# Patient Record
Sex: FEMALE | Race: WHITE | NOT HISPANIC OR LATINO | Employment: UNEMPLOYED | URBAN - METROPOLITAN AREA
[De-identification: names, ages, dates, MRNs, and addresses within clinical notes are randomized per-mention and may not be internally consistent; named-entity substitution may affect disease eponyms.]

---

## 2017-01-04 ENCOUNTER — LAB (OUTPATIENT)
Dept: LAB | Facility: HOSPITAL | Age: 32
End: 2017-01-04

## 2017-01-04 DIAGNOSIS — Z3A.09 9 WEEKS GESTATION OF PREGNANCY: Primary | ICD-10-CM

## 2017-01-04 LAB
ABO GROUP BLD: NORMAL
AMPHET+METHAMPHET UR QL: NEGATIVE
AMPHETAMINES UR QL: NEGATIVE
BACTERIA UR QL AUTO: ABNORMAL /HPF
BARBITURATES UR QL SCN: NEGATIVE
BASOPHILS # BLD AUTO: 0.01 10*3/MM3 (ref 0–0.2)
BASOPHILS NFR BLD AUTO: 0.1 % (ref 0–1)
BENZODIAZ UR QL SCN: NEGATIVE
BILIRUB UR QL STRIP: NEGATIVE
BLD GP AB SCN SERPL QL: NEGATIVE
BUPRENORPHINE SERPL-MCNC: NEGATIVE NG/ML
CANNABINOIDS SERPL QL: NEGATIVE
CLARITY UR: ABNORMAL
COCAINE UR QL: NEGATIVE
COLOR UR: YELLOW
DEPRECATED RDW RBC AUTO: 46.4 FL (ref 37–54)
EOSINOPHIL # BLD AUTO: 0.08 10*3/MM3 (ref 0.1–0.3)
EOSINOPHIL NFR BLD AUTO: 0.7 % (ref 0–3)
ERYTHROCYTE [DISTWIDTH] IN BLOOD BY AUTOMATED COUNT: 14.8 % (ref 11.3–14.5)
EXTERNAL ABO GROUPING: NORMAL
EXTERNAL ANTIBODY SCREEN: NEGATIVE
EXTERNAL HEPATITIS B SURFACE ANTIGEN: NEGATIVE
EXTERNAL HEPATITIS C AB: NEGATIVE
EXTERNAL RH FACTOR: POSITIVE
EXTERNAL RUBELLA QUALITATIVE: NORMAL
EXTERNAL SYPHILIS RPR SCREEN: NORMAL
GLUCOSE BLD-MCNC: 80 MG/DL (ref 70–100)
GLUCOSE UR STRIP-MCNC: NEGATIVE MG/DL
HBV SURFACE AG SERPL QL IA: NORMAL
HCT VFR BLD AUTO: 42.3 % (ref 34.5–44)
HCV AB SER DONR QL: NORMAL
HGB BLD-MCNC: 14.2 G/DL (ref 11.5–15.5)
HGB UR QL STRIP.AUTO: NEGATIVE
HIV1 AB SPEC QL IA.RAPID: NEGATIVE
HIV1+2 AB SER QL: NORMAL
HYALINE CASTS UR QL AUTO: ABNORMAL /LPF
IMM GRANULOCYTES # BLD: 0.02 10*3/MM3 (ref 0–0.03)
IMM GRANULOCYTES NFR BLD: 0.2 % (ref 0–0.6)
KETONES UR QL STRIP: NEGATIVE
LEUKOCYTE ESTERASE UR QL STRIP.AUTO: NEGATIVE
LYMPHOCYTES # BLD AUTO: 2 10*3/MM3 (ref 0.6–4.8)
LYMPHOCYTES NFR BLD AUTO: 18.7 % (ref 24–44)
MCH RBC QN AUTO: 29 PG (ref 27–31)
MCHC RBC AUTO-ENTMCNC: 33.6 G/DL (ref 32–36)
MCV RBC AUTO: 86.3 FL (ref 80–99)
METHADONE UR QL SCN: NEGATIVE
MONOCYTES # BLD AUTO: 0.63 10*3/MM3 (ref 0–1)
MONOCYTES NFR BLD AUTO: 5.9 % (ref 0–12)
NEUTROPHILS # BLD AUTO: 7.94 10*3/MM3 (ref 1.5–8.3)
NEUTROPHILS NFR BLD AUTO: 74.4 % (ref 41–71)
NITRITE UR QL STRIP: NEGATIVE
OPIATES UR QL: NEGATIVE
OXYCODONE UR QL SCN: NEGATIVE
PCP UR QL SCN: NEGATIVE
PH UR STRIP.AUTO: 5.5 [PH] (ref 5–8)
PLATELET # BLD AUTO: 410 10*3/MM3 (ref 150–450)
PMV BLD AUTO: 9.8 FL (ref 6–12)
PROPOXYPH UR QL: NEGATIVE
PROT UR QL STRIP: NEGATIVE
RBC # BLD AUTO: 4.9 10*6/MM3 (ref 3.89–5.14)
RBC # UR: ABNORMAL /HPF
REF LAB TEST METHOD: ABNORMAL
RH BLD: POSITIVE
RPR SER QL: NORMAL
RUBV IGG SER QL: NORMAL
RUBV IGG SER-ACNC: 82.9 IU/ML
SP GR UR STRIP: 1.02 (ref 1–1.03)
SQUAMOUS #/AREA URNS HPF: ABNORMAL /HPF
TRICYCLICS UR QL SCN: NEGATIVE
TSH SERPL DL<=0.05 MIU/L-ACNC: 1.81 MIU/ML (ref 0.35–5.35)
UROBILINOGEN UR QL STRIP: ABNORMAL
WBC NRBC COR # BLD: 10.68 10*3/MM3 (ref 3.5–10.8)
WBC UR QL AUTO: ABNORMAL /HPF

## 2017-01-04 PROCEDURE — 82947 ASSAY GLUCOSE BLOOD QUANT: CPT | Performed by: NURSE PRACTITIONER

## 2017-01-04 PROCEDURE — 87340 HEPATITIS B SURFACE AG IA: CPT | Performed by: NURSE PRACTITIONER

## 2017-01-04 PROCEDURE — 80306 DRUG TEST PRSMV INSTRMNT: CPT | Performed by: NURSE PRACTITIONER

## 2017-01-04 PROCEDURE — 86900 BLOOD TYPING SEROLOGIC ABO: CPT

## 2017-01-04 PROCEDURE — 86592 SYPHILIS TEST NON-TREP QUAL: CPT | Performed by: NURSE PRACTITIONER

## 2017-01-04 PROCEDURE — G0432 EIA HIV-1/HIV-2 SCREEN: HCPCS | Performed by: NURSE PRACTITIONER

## 2017-01-04 PROCEDURE — 86901 BLOOD TYPING SEROLOGIC RH(D): CPT

## 2017-01-04 PROCEDURE — 85025 COMPLETE CBC W/AUTO DIFF WBC: CPT | Performed by: NURSE PRACTITIONER

## 2017-01-04 PROCEDURE — 86850 RBC ANTIBODY SCREEN: CPT

## 2017-01-04 PROCEDURE — 86803 HEPATITIS C AB TEST: CPT | Performed by: NURSE PRACTITIONER

## 2017-01-04 PROCEDURE — 84443 ASSAY THYROID STIM HORMONE: CPT | Performed by: NURSE PRACTITIONER

## 2017-01-04 PROCEDURE — 36415 COLL VENOUS BLD VENIPUNCTURE: CPT

## 2017-01-04 PROCEDURE — 81001 URINALYSIS AUTO W/SCOPE: CPT | Performed by: NURSE PRACTITIONER

## 2017-06-01 ENCOUNTER — APPOINTMENT (OUTPATIENT)
Dept: LAB | Facility: HOSPITAL | Age: 32
End: 2017-06-01

## 2017-06-01 ENCOUNTER — TRANSCRIBE ORDERS (OUTPATIENT)
Dept: LAB | Facility: HOSPITAL | Age: 32
End: 2017-06-01

## 2017-06-01 DIAGNOSIS — Z3A.28 28 WEEKS GESTATION OF PREGNANCY: Primary | ICD-10-CM

## 2017-06-01 LAB
BLD GP AB SCN SERPL QL: NEGATIVE
DEPRECATED RDW RBC AUTO: 46.7 FL (ref 37–54)
ERYTHROCYTE [DISTWIDTH] IN BLOOD BY AUTOMATED COUNT: 14.3 % (ref 11.3–14.5)
GLUCOSE 1H P 100 G GLC PO SERPL-MCNC: 90 MG/DL (ref 65–199)
HCT VFR BLD AUTO: 37.2 % (ref 34.5–44)
HGB BLD-MCNC: 11.8 G/DL (ref 11.5–15.5)
MCH RBC QN AUTO: 28.6 PG (ref 27–31)
MCHC RBC AUTO-ENTMCNC: 31.7 G/DL (ref 32–36)
MCV RBC AUTO: 90.3 FL (ref 80–99)
PLATELET # BLD AUTO: 296 10*3/MM3 (ref 150–450)
PMV BLD AUTO: 8.8 FL (ref 6–12)
RBC # BLD AUTO: 4.12 10*6/MM3 (ref 3.89–5.14)
WBC NRBC COR # BLD: 9.55 10*3/MM3 (ref 3.5–10.8)

## 2017-06-01 PROCEDURE — 36415 COLL VENOUS BLD VENIPUNCTURE: CPT | Performed by: NURSE PRACTITIONER

## 2017-06-01 PROCEDURE — 82950 GLUCOSE TEST: CPT | Performed by: NURSE PRACTITIONER

## 2017-06-01 PROCEDURE — 86850 RBC ANTIBODY SCREEN: CPT | Performed by: NURSE PRACTITIONER

## 2017-06-01 PROCEDURE — 85027 COMPLETE CBC AUTOMATED: CPT | Performed by: NURSE PRACTITIONER

## 2017-07-18 LAB — EXTERNAL GROUP B STREP ANTIGEN: NEGATIVE

## 2017-08-19 ENCOUNTER — HOSPITAL ENCOUNTER (OUTPATIENT)
Facility: HOSPITAL | Age: 32
Setting detail: OBSERVATION
Discharge: HOME | End: 2017-08-19
Attending: NURSE PRACTITIONER | Admitting: OBSTETRICS & GYNECOLOGY

## 2017-08-19 VITALS — HEIGHT: 68 IN | TEMPERATURE: 98.3 F | WEIGHT: 256 LBS | BODY MASS INDEX: 38.8 KG/M2

## 2017-08-19 PROBLEM — Z34.90 PREGNANCY: Status: ACTIVE | Noted: 2017-08-19

## 2017-08-19 PROCEDURE — 59025 FETAL NON-STRESS TEST: CPT | Performed by: OBSTETRICS & GYNECOLOGY

## 2017-08-19 PROCEDURE — G0378 HOSPITAL OBSERVATION PER HR: HCPCS

## 2017-08-19 PROCEDURE — 59025 FETAL NON-STRESS TEST: CPT

## 2017-08-19 PROCEDURE — 99202 OFFICE O/P NEW SF 15 MIN: CPT | Performed by: OBSTETRICS & GYNECOLOGY

## 2017-08-19 RX ORDER — PRENATAL WITH FERROUS FUM AND FOLIC ACID 3080; 920; 120; 400; 22; 1.84; 3; 20; 10; 1; 12; 200; 27; 25; 2 [IU]/1; [IU]/1; MG/1; [IU]/1; MG/1; MG/1; MG/1; MG/1; MG/1; MG/1; UG/1; MG/1; MG/1; MG/1; MG/1
1 TABLET ORAL EVERY OTHER DAY
COMMUNITY

## 2017-08-19 NOTE — PROGRESS NOTES
[facility]  Obstetric History and Physical    Chief Complaint   Patient presents with   • Laboring     ctx, but not regular, some vag bleeding       Subjective     Patient is a 32 y.o. female  currently at 40w4d, who presents with irr contractions. Reports irregular contractions throughout the day without any leaking of fluid.  Patient does admit to having some small amount of bloody discharge.  Patient reports normal fetal activity.  Patient's prenatal course has been uncomplicated with patient Daljit.    Her prenatal care is complicated by  none.  Her previous obstetric/gynecological history is noted for is remarkable for .    The following portions of the patients history were reviewed and updated as appropriate: current medications, allergies, past medical history, past surgical history, past family history, past social history and problem list .       Prenatal Information:   Maternal Prenatal Labs  Blood Type No results found for: ABO   Rh Status No results found for: RH   Antibody Screen No results found for: ABSCRN   Gonnorhea No results found for: GCCX   Chlamydia No results found for: CLAMYDCU   RPR No results found for: RPR   Syphilis Antibody No results found for: SYPHILIS   Rubella No results found for: RUBELLAIGGIN   Hepatitis B Surface Antigen No results found for: HEPBSAG   HIV-1 Antibody No results found for: LABHIV1   Hepatitis C Antibody No results found for: HEPCAB   Rapid [place]rin Drug Screen No results found for: AMPMETHU, BARBITSCNUR, LABBENZSCN, LABMETHSCN, LABOPIASCN, THCURSCR, COCAINEUR, AMPHETSCREEN, PROPOXSCN, BUPRENORSCNU, METAMPSCNUR, OXYCODONESCN, TRICYCLICSCN   Group B Strep Culture No results found for: GBSANTIGEN                 Past OB History:       Obstetric History       T0      TAB0   SAB0   E0   M0   L0       # Outcome Date GA Lbr Trung/2nd Weight Sex Delivery Anes PTL Lv   1 Current                   Past Medical History: No past medical history on file.    Past Surgical History No past surgical history on file.   Family History: Family History   Problem Relation Age of Onset   • No Known Problems Father    • No Known Problems Mother       Social History:  reports that she has never smoked. She has never used smokeless tobacco.   reports that she does not drink alcohol.   reports that she does not use illicit drugs.                   General ROS Negative Findings:Headaches, Visual Changes, Epigastric pain, Anorexia, Nausia/Vomiting, ROM and Vaginal Bleeding    ROS      Objective       Vital Signs Range for the last 24 hours  Temperature: Temp:  [98.3 °F (36.8 °C)] 98.3 °F (36.8 °C)   Temp Source: Temp src: Oral   BP:     Pulse:     Respirations:     SPO2:     O2 Amount (l/min):     O2 Devices     Weight: Weight:  [256 lb (116 kg)] 256 lb (116 kg)     Physical Examination:   General:   alert, appears stated age and cooperative   Skin:   normal   HEENT:     Lungs:   clear to auscultation bilaterally   Heart:   regular rate and rhythm, S1, S2 normal, no murmur, click, rub or gallop   Abdomen:  soft, gravid uterus non tender   Lower Extremeties 1+ edema, no calf tenderness,    Pelvis:  External genitalia: normal general appearance         Presentation:    Cervix: Exam by: Method: sterile exam per physician   Dilation: Dilation: 2.5   Effacement: Cervical Effacement: 80%   Station: Station: -2       Fetal Heart Rate Assessment   Method:     Beats/min:     Baseline:     Varibility:     Accels:     Decels:     Tracing Category:     Nst ind  Ctx,  Reactive, mod variability, + accel 15x15, no decel, onset 1508 offset, 1815  Uterine Assessment   Method:     Frequency (min):     Ctx Count in 10 min:     Duration:     Intensity:     Intensity by IUPC:     Resting Tone:     Resting Tone by IUPC:     [place] Units:       Laboratory Results: @irehffcf11@  Radiology Review:@lastrad@  Other Studies:    Assessment/Plan     Active Problems:    Pregnancy        Assessment:  1.   Intrauterine pregnancy at 40w4d weeks gestation with reactive fetal status.    2.  No evidence of labor after 4hr  3.  Normal U/S 8/18 per patient  4.      Plan:  1. discharge to home, labor instructions,  2. Plan of care has been reviewed with patient.  3.  Risks, benefits of treatment plan have been discussed.  4.  All questions have been answered.  5      Elliot Cazares DO  8/19/2017  4:08 PM

## 2017-08-20 ENCOUNTER — HOSPITAL ENCOUNTER (INPATIENT)
Facility: HOSPITAL | Age: 32
LOS: 2 days | Discharge: HOME | End: 2017-08-22
Attending: NURSE PRACTITIONER | Admitting: OBSTETRICS & GYNECOLOGY

## 2017-08-20 DIAGNOSIS — Z37.9 NORMAL LABOR: Primary | ICD-10-CM

## 2017-08-20 LAB
ABO GROUP BLD: NORMAL
BLD GP AB SCN SERPL QL: NEGATIVE
DEPRECATED RDW RBC AUTO: 48 FL (ref 37–54)
ERYTHROCYTE [DISTWIDTH] IN BLOOD BY AUTOMATED COUNT: 15.9 % (ref 11.3–14.5)
HCT VFR BLD AUTO: 37.4 % (ref 34.5–44)
HGB BLD-MCNC: 12.1 G/DL (ref 11.5–15.5)
MCH RBC QN AUTO: 26.9 PG (ref 27–31)
MCHC RBC AUTO-ENTMCNC: 32.4 G/DL (ref 32–36)
MCV RBC AUTO: 83.3 FL (ref 80–99)
PLATELET # BLD AUTO: 257 10*3/MM3 (ref 150–450)
PMV BLD AUTO: 9.7 FL (ref 6–12)
RBC # BLD AUTO: 4.49 10*6/MM3 (ref 3.89–5.14)
RH BLD: POSITIVE
WBC NRBC COR # BLD: 11.1 10*3/MM3 (ref 3.5–10.8)

## 2017-08-20 PROCEDURE — 86900 BLOOD TYPING SEROLOGIC ABO: CPT | Performed by: OBSTETRICS & GYNECOLOGY

## 2017-08-20 PROCEDURE — 59025 FETAL NON-STRESS TEST: CPT

## 2017-08-20 PROCEDURE — P9612 CATHETERIZE FOR URINE SPEC: HCPCS

## 2017-08-20 PROCEDURE — 85027 COMPLETE CBC AUTOMATED: CPT | Performed by: OBSTETRICS & GYNECOLOGY

## 2017-08-20 PROCEDURE — 86850 RBC ANTIBODY SCREEN: CPT | Performed by: OBSTETRICS & GYNECOLOGY

## 2017-08-20 PROCEDURE — 86901 BLOOD TYPING SEROLOGIC RH(D): CPT | Performed by: OBSTETRICS & GYNECOLOGY

## 2017-08-20 RX ORDER — MISOPROSTOL 200 UG/1
800 TABLET ORAL AS NEEDED
Status: DISCONTINUED | OUTPATIENT
Start: 2017-08-20 | End: 2017-08-20 | Stop reason: HOSPADM

## 2017-08-20 RX ORDER — LIDOCAINE HYDROCHLORIDE 10 MG/ML
5 INJECTION, SOLUTION INFILTRATION; PERINEURAL AS NEEDED
Status: DISCONTINUED | OUTPATIENT
Start: 2017-08-20 | End: 2017-08-20 | Stop reason: HOSPADM

## 2017-08-20 RX ORDER — OXYTOCIN/RINGER'S LACTATE 20/1000 ML
125 PLASTIC BAG, INJECTION (ML) INTRAVENOUS CONTINUOUS PRN
Status: DISCONTINUED | OUTPATIENT
Start: 2017-08-20 | End: 2017-08-20 | Stop reason: HOSPADM

## 2017-08-20 RX ORDER — ONDANSETRON 2 MG/ML
4 INJECTION INTRAMUSCULAR; INTRAVENOUS EVERY 6 HOURS PRN
Status: DISCONTINUED | OUTPATIENT
Start: 2017-08-20 | End: 2017-08-22 | Stop reason: HOSPADM

## 2017-08-20 RX ORDER — MINERAL OIL
OIL (ML) MISCELLANEOUS
Status: DISCONTINUED | OUTPATIENT
Start: 2017-08-20 | End: 2017-08-22 | Stop reason: HOSPADM

## 2017-08-20 RX ORDER — OXYTOCIN/RINGER'S LACTATE 20/1000 ML
999 PLASTIC BAG, INJECTION (ML) INTRAVENOUS ONCE
Status: COMPLETED | OUTPATIENT
Start: 2017-08-20 | End: 2017-08-20

## 2017-08-20 RX ORDER — ONDANSETRON 4 MG/1
4 TABLET, FILM COATED ORAL EVERY 6 HOURS PRN
Status: DISCONTINUED | OUTPATIENT
Start: 2017-08-20 | End: 2017-08-20 | Stop reason: HOSPADM

## 2017-08-20 RX ORDER — ONDANSETRON 4 MG/1
4 TABLET, FILM COATED ORAL EVERY 6 HOURS PRN
Status: DISCONTINUED | OUTPATIENT
Start: 2017-08-20 | End: 2017-08-22 | Stop reason: HOSPADM

## 2017-08-20 RX ORDER — SODIUM CHLORIDE 0.9 % (FLUSH) 0.9 %
1-10 SYRINGE (ML) INJECTION AS NEEDED
Status: DISCONTINUED | OUTPATIENT
Start: 2017-08-20 | End: 2017-08-20 | Stop reason: HOSPADM

## 2017-08-20 RX ORDER — DOCUSATE SODIUM 100 MG/1
100 CAPSULE, LIQUID FILLED ORAL 2 TIMES DAILY PRN
Status: DISCONTINUED | OUTPATIENT
Start: 2017-08-20 | End: 2017-08-22 | Stop reason: HOSPADM

## 2017-08-20 RX ORDER — SODIUM CHLORIDE, SODIUM LACTATE, POTASSIUM CHLORIDE, CALCIUM CHLORIDE 600; 310; 30; 20 MG/100ML; MG/100ML; MG/100ML; MG/100ML
125 INJECTION, SOLUTION INTRAVENOUS CONTINUOUS
Status: DISCONTINUED | OUTPATIENT
Start: 2017-08-20 | End: 2017-08-22 | Stop reason: HOSPADM

## 2017-08-20 RX ORDER — PRENATAL VIT/IRON FUM/FOLIC AC 27MG-0.8MG
1 TABLET ORAL DAILY
Status: DISCONTINUED | OUTPATIENT
Start: 2017-08-21 | End: 2017-08-22 | Stop reason: HOSPADM

## 2017-08-20 RX ORDER — CARBOPROST TROMETHAMINE 250 UG/ML
250 INJECTION, SOLUTION INTRAMUSCULAR AS NEEDED
Status: DISCONTINUED | OUTPATIENT
Start: 2017-08-20 | End: 2017-08-20 | Stop reason: HOSPADM

## 2017-08-20 RX ORDER — IBUPROFEN 600 MG/1
600 TABLET ORAL EVERY 6 HOURS PRN
Status: DISCONTINUED | OUTPATIENT
Start: 2017-08-20 | End: 2017-08-22 | Stop reason: HOSPADM

## 2017-08-20 RX ORDER — BISACODYL 10 MG
10 SUPPOSITORY, RECTAL RECTAL DAILY PRN
Status: DISCONTINUED | OUTPATIENT
Start: 2017-08-21 | End: 2017-08-22 | Stop reason: HOSPADM

## 2017-08-20 RX ORDER — CALCIUM CARBONATE 200(500)MG
2 TABLET,CHEWABLE ORAL 3 TIMES DAILY PRN
Status: DISCONTINUED | OUTPATIENT
Start: 2017-08-20 | End: 2017-08-22 | Stop reason: HOSPADM

## 2017-08-20 RX ORDER — ONDANSETRON 2 MG/ML
4 INJECTION INTRAMUSCULAR; INTRAVENOUS EVERY 6 HOURS PRN
Status: DISCONTINUED | OUTPATIENT
Start: 2017-08-20 | End: 2017-08-20 | Stop reason: HOSPADM

## 2017-08-20 RX ORDER — ACETAMINOPHEN 325 MG/1
650 TABLET ORAL EVERY 4 HOURS PRN
Status: DISCONTINUED | OUTPATIENT
Start: 2017-08-20 | End: 2017-08-22 | Stop reason: HOSPADM

## 2017-08-20 RX ORDER — METHYLERGONOVINE MALEATE 0.2 MG/ML
200 INJECTION INTRAVENOUS ONCE AS NEEDED
Status: DISCONTINUED | OUTPATIENT
Start: 2017-08-20 | End: 2017-08-20 | Stop reason: HOSPADM

## 2017-08-20 RX ORDER — ACETAMINOPHEN 325 MG/1
650 TABLET ORAL EVERY 4 HOURS PRN
Status: DISCONTINUED | OUTPATIENT
Start: 2017-08-20 | End: 2017-08-20 | Stop reason: HOSPADM

## 2017-08-20 RX ORDER — SODIUM CHLORIDE 0.9 % (FLUSH) 0.9 %
1-10 SYRINGE (ML) INJECTION AS NEEDED
Status: DISCONTINUED | OUTPATIENT
Start: 2017-08-20 | End: 2017-08-22 | Stop reason: HOSPADM

## 2017-08-20 RX ADMIN — HYDROCORTISONE 2.5% 1 APPLICATION: 25 CREAM TOPICAL at 22:08

## 2017-08-20 RX ADMIN — IBUPROFEN 600 MG: 600 TABLET, FILM COATED ORAL at 22:08

## 2017-08-20 RX ADMIN — Medication 999 ML/HR: at 19:35

## 2017-08-20 RX ADMIN — Medication 125 ML/HR: at 20:45

## 2017-08-20 RX ADMIN — Medication 2 APPLICATION: at 18:48

## 2017-08-20 RX ADMIN — BENZOCAINE 1 APPLICATION: 5.6 OINTMENT TOPICAL at 22:09

## 2017-08-20 RX ADMIN — BENZOCAINE AND MENTHOL: 20; .5 SPRAY TOPICAL at 22:08

## 2017-08-20 RX ADMIN — WITCH HAZEL 1 PAD: 500 SOLUTION RECTAL; TOPICAL at 22:08

## 2017-08-20 NOTE — PLAN OF CARE
Problem: Patient Care Overview (Adult)  Goal: Plan of Care Review  Outcome: Ongoing (interventions implemented as appropriate)    08/20/17 0550   Coping/Psychosocial Response Interventions   Plan Of Care Reviewed With patient   Patient Care Overview   Progress progress toward functional goals as expected       Goal: Adult Individualization and Mutuality  Outcome: Ongoing (interventions implemented as appropriate)  Goal: Discharge Needs Assessment  Outcome: Ongoing (interventions implemented as appropriate)    Problem: Labor (Cervical Ripen, Induct, Augment) (Adult,Obstetrics,Pediatric)  Goal: Signs and Symptoms of Listed Potential Problems Will be Absent or Manageable (Labor)  Outcome: Ongoing (interventions implemented as appropriate)

## 2017-08-20 NOTE — NURSING NOTE
Dr. Davis called to inquiry about patient reviewed most recent exam and plan of care no new orders received.

## 2017-08-20 NOTE — PROGRESS NOTES
"08/20/17  5:30 PM  Esperanza Waldron    SUBJECTIVE: Pt continues to do well, laboring naturally.     ASSESSMENTS:     /84  Pulse (!) 125  Temp 98.1 °F (36.7 °C) (Oral)   Resp 18  Ht 67.5\" (171.5 cm)  Wt 256 lb (116 kg)  Breastfeeding? Yes  BMI 39.5 kg/m2    Fetal Heart Rate Assessment   Method: Fetal HR Assessment Method: intermittent auscultation, using Doppler   Beats/min: Fetal HR (Beats/Min): 142   Baseline: Fetal HR Baseline: normal range (110-160 bpm)   Varibility: Fetal HR Variability: moderate (amplitude range 6 to 25 bpm)   Accels: Fetal HR Accelerations: greater than/equal to 15 bpm, lasting at least 15 seconds   Decels: Fetal HR Decelerations: absent   Tracing Category:       Uterine Assessment   Method: Method: TOCO (external toco transducer)   Frequency (min): Contraction Frequency (min): 3-4   Ctx Count in 10 min:     Duration: Contraction Duration (sec): 40-60   Intensity: Contraction Intensity: mild by palpation   Intensity by IUPC:     Resting Tone: Uterine Resting Tone: soft by palpation   Resting Tone by IUPC:       Presentation: vtx   Cervix: Exam by: Method: sterile exam per RN   Dilation: Dilation: 9.5   Effacement: Cervical Effacement: 100%   Station: Station: 0            PLAN: Anterior lip with small lip on maternal R side. Pt moved to R lateral position. Will continue expectant management.    Inge Davis MD  5:30 PM  08/20/17    "

## 2017-08-20 NOTE — PROGRESS NOTES
"08/20/17  2:35 PM  Esperanza Waldron    SUBJECTIVE: Pt doing well with natural labor. Has been laboring in tub. Progressing without augmentation, breathing through ctx. +FM, clear fluid with ROM, no VB.    ASSESSMENTS:     /73 (BP Location: Right arm, Patient Position: Sitting)  Pulse 120  Temp 98.5 °F (36.9 °C) (Oral)   Resp 18  Ht 67.5\" (171.5 cm)  Wt 256 lb (116 kg)  Breastfeeding? Yes  BMI 39.5 kg/m2    Fetal Heart Rate Assessment   Method: Fetal HR Assessment Method: intermittent auscultation, using Doppler   Beats/min: Fetal HR (Beats/Min): 142   Baseline: Fetal HR Baseline: normal range (110-160 bpm)   Varibility: Fetal HR Variability: moderate (amplitude range 6 to 25 bpm)   Accels: Fetal HR Accelerations: greater than/equal to 15 bpm, lasting at least 15 seconds   Decels: Fetal HR Decelerations: absent   Tracing Category:       Uterine Assessment   Method: Method: TOCO (external toco transducer)   Frequency (min): Contraction Frequency (min): 3-4   Ctx Count in 10 min:     Duration: Contraction Duration (sec): 40-60   Intensity: Contraction Intensity: mild by palpation   Intensity by IUPC:     Resting Tone: Uterine Resting Tone: soft by palpation   Resting Tone by IUPC:       Presentation: vtx   Cervix: Exam by: Method: sterile exam per physician   Dilation: Dilation: 7   Effacement: Cervical Effacement: 80%   Station: Station: 0            PLAN: Continue expectant management.    Inge Davis MD  2:35 PM  08/20/17    "

## 2017-08-21 LAB
HCT VFR BLD AUTO: 31.5 % (ref 34.5–44)
HGB BLD-MCNC: 10.2 G/DL (ref 11.5–15.5)

## 2017-08-21 PROCEDURE — 85014 HEMATOCRIT: CPT | Performed by: OBSTETRICS & GYNECOLOGY

## 2017-08-21 PROCEDURE — 85018 HEMOGLOBIN: CPT | Performed by: OBSTETRICS & GYNECOLOGY

## 2017-08-21 RX ORDER — LANOLIN 100 %
OINTMENT (GRAM) TOPICAL AS NEEDED
Status: DISCONTINUED | OUTPATIENT
Start: 2017-08-21 | End: 2017-08-22 | Stop reason: HOSPADM

## 2017-08-21 RX ADMIN — PRENATAL VIT W/ FE FUMARATE-FA TAB 27-0.8 MG 1 TABLET: 27-0.8 TAB at 08:41

## 2017-08-21 RX ADMIN — IBUPROFEN 600 MG: 600 TABLET, FILM COATED ORAL at 12:48

## 2017-08-21 RX ADMIN — DOCUSATE SODIUM 100 MG: 100 CAPSULE, LIQUID FILLED ORAL at 08:41

## 2017-08-21 RX ADMIN — Medication: at 08:30

## 2017-08-21 NOTE — L&D DELIVERY NOTE
Uncomplicated  over a second degree laceration.  Anterior shoulder delivered with ease.  Infant vigorous at delivery so placed on maternal abdomen.  Delayed cord clamping x 1 min.  Cord doubly clamped and cut.  Laceration repaired in standard fashion using 3-0 vicryl.

## 2017-08-21 NOTE — PLAN OF CARE
Problem: Patient Care Overview (Adult)  Goal: Plan of Care Review  Outcome: Ongoing (interventions implemented as appropriate)    Problem: Breastfeeding (Adult,NICU,[place],Obstetrics,Pediatric)  Goal: Signs and Symptoms of Listed Potential Problems Will be Absent or Manageable (Breastfeeding)  Outcome: Ongoing (interventions implemented as appropriate)

## 2017-08-21 NOTE — PROGRESS NOTES
[facility]  Vaginal Delivery Progress Note    Subjective     Doing well, pain controlled, lochia less than menses      Objective     Vital Signs Range for the last 24 hours  Temperature: Temp:  [97.6 °F (36.4 °C)-98.8 °F (37.1 °C)] 97.7 °F (36.5 °C)   Temp Source: Temp src: Oral   BP: BP: ()/(56-83) 113/68   Pulse: Heart Rate:  [] 89   Respirations: Resp:  [16-18] 16   SPO2:     O2 Amount (l/min):     O2 Devices           Physical Exam:  General:  no acute distresss.  Abdomen: Soft, non-tender, fundus firm  Extremities: normal, atraumatic, no cyanosis, and trace edema.       Lab results reviewed:  Yes    Lab Results   Component Value Date    WBC 11.10 (H) 08/20/2017    HGB 10.2 (L) 08/21/2017    HCT 31.5 (L) 08/21/2017    MCV 83.3 08/20/2017     08/20/2017         Assessment/Plan     Active Problems:    Postpartum care following vaginal delivery      Esperanza Waldron is Day 1  post-partum       Plan:  Continue current care.      Talia Bocanegra CNM  8/21/2017  5:12 PM

## 2017-08-21 NOTE — LACTATION NOTE
"This note was copied from a baby's chart.     08/21/17 3974   Maternal Information   Date of Referral 08/21/17   Person Making Referral other (see comments)  (courtesy)   Maternal Infant Assessment   Size Issue, Bilateral Breasts no   Shape, Bilateral Breasts round   Density, Bilateral Breasts soft   Nipple Conditions, Bilateral intact   Maternal Infant Feeding   Maternal Emotional State relaxed   Previous Breastfeeding History no   Infant Positioning cradle;clutch/\"football\"  (per mom)   Current Delivery Breastfeeding History   Currently Breastfeeding no   Equipment Type/Education   Breast Pump Type (Mom has signed Rx, gave card for [facility] and enc. call ASAP)     "

## 2017-08-22 ENCOUNTER — HOSPITAL ENCOUNTER (OUTPATIENT)
Dept: LABOR AND DELIVERY | Facility: HOSPITAL | Age: 32
End: 2017-08-22

## 2017-08-22 VITALS
HEIGHT: 68 IN | HEART RATE: 77 BPM | BODY MASS INDEX: 38.8 KG/M2 | DIASTOLIC BLOOD PRESSURE: 58 MMHG | RESPIRATION RATE: 18 BRPM | WEIGHT: 256 LBS | SYSTOLIC BLOOD PRESSURE: 101 MMHG | TEMPERATURE: 98.7 F

## 2017-08-22 PROBLEM — Z34.90 PREGNANCY: Status: RESOLVED | Noted: 2017-08-19 | Resolved: 2017-08-22

## 2017-08-22 RX ADMIN — IBUPROFEN 600 MG: 600 TABLET, FILM COATED ORAL at 08:30

## 2017-08-22 RX ADMIN — PRENATAL VIT W/ FE FUMARATE-FA TAB 27-0.8 MG 1 TABLET: 27-0.8 TAB at 08:30

## 2017-08-22 RX ADMIN — DOCUSATE SODIUM 100 MG: 100 CAPSULE, LIQUID FILLED ORAL at 08:30

## 2017-08-22 NOTE — DISCHARGE SUMMARY
[facility]  Vaginal Delivery Discharge Summary      Patient: Esperanza Waldron      MR#:[id number]  Admission  Diagnosis: Term Pregnancy  Discharge Diagnosis: Vaginal Delivery    Date of Admission: 8/20/2017  Date of Discharge:  8/22/2017    Procedures:  Vaginal, Spontaneous Delivery     8/20/2017    7:26 PM      Service:  Obstetrics    Hospital Course:  Patient underwent vaginal delivery and remained in the hospital for 2 days.  During that time she remained afebrile and hemodynamically stable.  On the day of discharge, she was eating, ambulating and voiding without difficulty.      Lab Results   Component Value Date    WBC 11.10 (H) 08/20/2017    HGB 10.2 (L) 08/21/2017    HCT 31.5 (L) 08/21/2017    MCV 83.3 08/20/2017     08/20/2017       Results from last 7 days  Lab Units 08/20/17  0344   ABO TYPING  O   RH TYPING  Positive   ANTIBODY SCREEN  Negative       Discharge Medications   Esperanza Waldron   Home Medication Instructions RADHAMES:[id number]    Printed on:08/22/17 0854   Medication Information                      Prenatal Vit-Fe Fumarate-FA (PRENATAL 27-1) 27-1 MG tablet tablet  Take 1 tablet by mouth Every Other Day.                 Discharge Disposition:  To Home    Discharge Condition:  Stable    Discharge Diet: regular    Activity at Discharge: Pelvic rest    Follow-up Appointments  6 weeks    Talia Bocanegra CNM  08/22/17  8:54 AM

## 2017-08-22 NOTE — PLAN OF CARE
Problem: Patient Care Overview (Adult)  Goal: Plan of Care Review  Outcome: Outcome(s) achieved Date Met:  17 1050   Coping/Psychosocial Response Interventions   Plan Of Care Reviewed With patient   Patient Care Overview   Progress improving       Goal: Adult Individualization and Mutuality  Outcome: Outcome(s) achieved Date Met:  17  Goal: Discharge Needs Assessment  Outcome: Outcome(s) achieved Date Met:  17    Problem: Labor (Cervical Ripen, Induct, Augment) (Adult,Obstetrics,Pediatric)  Goal: Signs and Symptoms of Listed Potential Problems Will be Absent or Manageable (Labor)  Outcome: Outcome(s) achieved Date Met:  17    Problem: Breastfeeding (Adult,NICU,,Obstetrics,Pediatric)  Goal: Signs and Symptoms of Listed Potential Problems Will be Absent or Manageable (Breastfeeding)  Outcome: Outcome(s) achieved Date Met:  17

## 2017-08-22 NOTE — PROGRESS NOTES
[facility]  Vaginal Delivery Progress Note    Subjective     Doing well, pain controlled, lochia less than menses      Objective     Vital Signs Range for the last 24 hours  Temperature: Temp:  [97.7 °F (36.5 °C)-98.7 °F (37.1 °C)] 98.7 °F (37.1 °C)   Temp Source: Temp src: Oral   BP: BP: (101-113)/(58-68) 101/58   Pulse: Heart Rate:  [77-89] 77   Respirations: Resp:  [16-18] 18   SPO2:     O2 Amount (l/min):     O2 Devices           Physical Exam:  General:  no acute distresss.  Abdomen: Soft, non-tender, fundus firm  Extremities: normal, atraumatic, no cyanosis, and trace edema.       Lab results reviewed:  Yes    Lab Results   Component Value Date    WBC 11.10 (H) 08/20/2017    HGB 10.2 (L) 08/21/2017    HCT 31.5 (L) 08/21/2017    MCV 83.3 08/20/2017     08/20/2017         Assessment/Plan     Active Problems:    Postpartum care following vaginal delivery      Esperanza Waldron is Day 2  post-partum       Plan:  Discharge home with standard precautions and return to clinic in 4-6 weeks.      Talia Bocanegra CNM  8/22/2017  8:52 AM

## 2017-08-22 NOTE — PLAN OF CARE
Problem: Patient Care Overview (Adult)  Goal: Plan of Care Review  Outcome: Ongoing (interventions implemented as appropriate)    17 0631   Coping/Psychosocial Response Interventions   Plan Of Care Reviewed With patient;spouse   Patient Care Overview   Progress improving   Outcome Evaluation   Outcome Summary/Follow up Plan VSS. Pt up ad bentley. No pain meds taken this shift. Breastfeeding Q 1-3 hours.       Goal: Adult Individualization and Mutuality  Outcome: Ongoing (interventions implemented as appropriate)  Goal: Discharge Needs Assessment  Outcome: Ongoing (interventions implemented as appropriate)    Problem: Breastfeeding (Adult,NICU,[place],Obstetrics,Pediatric)  Goal: Signs and Symptoms of Listed Potential Problems Will be Absent or Manageable (Breastfeeding)  Outcome: Ongoing (interventions implemented as appropriate)

## 2018-12-10 ENCOUNTER — LAB (OUTPATIENT)
Dept: LAB | Facility: HOSPITAL | Age: 33
End: 2018-12-10

## 2018-12-10 ENCOUNTER — TRANSCRIBE ORDERS (OUTPATIENT)
Dept: LAB | Facility: HOSPITAL | Age: 33
End: 2018-12-10

## 2018-12-10 DIAGNOSIS — Z32.01 PREGNANCY EXAMINATION OR TEST, POSITIVE RESULT: Primary | ICD-10-CM

## 2018-12-10 DIAGNOSIS — Z32.01 PREGNANCY EXAMINATION OR TEST, POSITIVE RESULT: ICD-10-CM

## 2018-12-10 LAB
ABO GROUP BLD: NORMAL
B-HCG UR QL: POSITIVE
BLD GP AB SCN SERPL QL: NEGATIVE
PROGEST SERPL-MCNC: 2.85 NG/ML
RH BLD: POSITIVE

## 2018-12-10 PROCEDURE — 81025 URINE PREGNANCY TEST: CPT

## 2018-12-10 PROCEDURE — 84702 CHORIONIC GONADOTROPIN TEST: CPT

## 2018-12-10 PROCEDURE — 86901 BLOOD TYPING SEROLOGIC RH(D): CPT

## 2018-12-10 PROCEDURE — 86900 BLOOD TYPING SEROLOGIC ABO: CPT

## 2018-12-10 PROCEDURE — 86850 RBC ANTIBODY SCREEN: CPT

## 2018-12-10 PROCEDURE — 84144 ASSAY OF PROGESTERONE: CPT

## 2018-12-10 PROCEDURE — 36415 COLL VENOUS BLD VENIPUNCTURE: CPT

## 2018-12-12 LAB — HCG INTACT+B SERPL-ACNC: 8031 MIU/ML

## 2021-05-18 DIAGNOSIS — O20.0 THREATENED ABORTION: Primary | ICD-10-CM

## 2021-05-18 NOTE — TELEPHONE ENCOUNTER
Pt calling to maker her NOB, LMP 3/11, now bleeding like a period. Blood type Rh +. Super irreg periods.  Her mom is Jacquie Millsjaime

## 2021-05-19 ENCOUNTER — LAB (OUTPATIENT)
Dept: OBSTETRICS AND GYNECOLOGY | Facility: CLINIC | Age: 36
End: 2021-05-19

## 2021-05-19 DIAGNOSIS — O20.0 THREATENED ABORTION: Primary | ICD-10-CM

## 2021-05-20 ENCOUNTER — LAB (OUTPATIENT)
Dept: OBSTETRICS AND GYNECOLOGY | Facility: CLINIC | Age: 36
End: 2021-05-20

## 2021-05-20 ENCOUNTER — TELEPHONE (OUTPATIENT)
Dept: OBSTETRICS AND GYNECOLOGY | Facility: CLINIC | Age: 36
End: 2021-05-20

## 2021-05-20 LAB
HCG INTACT+B SERPL-ACNC: NORMAL MIU/ML
PROGEST SERPL-MCNC: 0.3 NG/ML

## 2021-05-20 RX ORDER — PROGESTERONE 100 MG/1
100 CAPSULE ORAL DAILY
Qty: 30 CAPSULE | Refills: 1 | Status: SHIPPED | OUTPATIENT
Start: 2021-05-20

## 2021-05-20 NOTE — ADDENDUM NOTE
[address] [place] [address]  [address] [place] [address]. [place] [address]  Phone: [phone]  Fax: [phone]    Mary Cohen LPN        March 22, 2021    [place] [address]      Dear Viviane Saavedra:    We have made several attempts to contact you by phone and have   been unsuccessful. Please call our office at your earliest convenience  At [phone] opt [phone]. Thank you. If you have any questions or concerns, please don't hesitate to call.     Sincerely,        Mary Cohen LPN Addended by: JOBY BRANHAM on: 5/20/2021 02:19 PM     Modules accepted: Orders

## 2021-05-20 NOTE — TELEPHONE ENCOUNTER
Her prog is very low. She states she is over weight and has been taking that Inositol for the past few months. We have never seen this pt - Does she need apt or just prog and repeat labs. She states she has been bleeding for a while.

## 2021-05-20 NOTE — TELEPHONE ENCOUNTER
Come in for stat hcg tomorrow, Prom 100mg po until 10-12 weeks pregnancy and US and see Dr. Dalton Monday (GYN/TAB)

## 2021-05-20 NOTE — TELEPHONE ENCOUNTER
Patient has questions about lab results that she saw on mychart. She also states that she passed a large clot but that she threw up last night and has nausea as well today. She says she does not know what is going on.

## 2021-05-21 ENCOUNTER — LAB (OUTPATIENT)
Dept: OBSTETRICS AND GYNECOLOGY | Facility: CLINIC | Age: 36
End: 2021-05-21

## 2021-05-21 DIAGNOSIS — O03.9 SPONTANEOUS ABORTION: Primary | ICD-10-CM

## 2021-05-24 ENCOUNTER — TELEPHONE (OUTPATIENT)
Dept: OBSTETRICS AND GYNECOLOGY | Facility: CLINIC | Age: 36
End: 2021-05-24

## 2021-05-24 DIAGNOSIS — O03.9 MISCARRIAGE: Primary | ICD-10-CM

## 2021-05-24 NOTE — TELEPHONE ENCOUNTER
Pt is having right sided pain that comes and goes, rates a 4 on scale of 0-10 - she has passed tissue but no sac. Denies saturating a pad every 30 min to 1 hour, fever, severe pain. Dr. Dalton said to cancel the NOB and repeat her hcg tomorrow. ovu

## 2021-05-26 ENCOUNTER — TELEPHONE (OUTPATIENT)
Dept: OBSTETRICS AND GYNECOLOGY | Facility: CLINIC | Age: 36
End: 2021-05-26

## 2021-06-01 ENCOUNTER — LAB (OUTPATIENT)
Dept: OBSTETRICS AND GYNECOLOGY | Facility: CLINIC | Age: 36
End: 2021-06-01

## 2021-06-02 LAB — HCG INTACT+B SERPL-ACNC: 207 MIU/ML

## 2022-05-20 DIAGNOSIS — Z34.90 PREGNANCY, UNSPECIFIED GESTATIONAL AGE: Primary | ICD-10-CM

## 2022-06-01 ENCOUNTER — INITIAL PRENATAL (OUTPATIENT)
Dept: OBSTETRICS AND GYNECOLOGY | Facility: CLINIC | Age: 37
End: 2022-06-01

## 2022-06-01 VITALS — WEIGHT: 264 LBS | SYSTOLIC BLOOD PRESSURE: 120 MMHG | BODY MASS INDEX: 40.74 KG/M2 | DIASTOLIC BLOOD PRESSURE: 70 MMHG

## 2022-06-01 DIAGNOSIS — Z34.90 PREGNANCY, UNSPECIFIED GESTATIONAL AGE: ICD-10-CM

## 2022-06-01 DIAGNOSIS — O26.21 HISTORY OF RECURRENT ABORTION, CURRENTLY PREGNANT IN FIRST TRIMESTER: Primary | ICD-10-CM

## 2022-06-01 LAB
GLUCOSE UR STRIP-MCNC: NEGATIVE MG/DL
PROT UR STRIP-MCNC: NEGATIVE MG/DL

## 2022-06-01 PROCEDURE — 0501F PRENATAL FLOW SHEET: CPT | Performed by: OBSTETRICS & GYNECOLOGY

## 2022-06-01 PROCEDURE — 76817 TRANSVAGINAL US OBSTETRIC: CPT | Performed by: OBSTETRICS & GYNECOLOGY

## 2022-06-01 NOTE — PROGRESS NOTES
Initial ob visit     CC- Here for care of pregnancy Pt having some nausea, but managable. Hx of 2 MAB. Rev prenatal book. Due for pap     Esperanza Waldron is a 36 y.o. female, , who presents for her first obstetrical visit.  Her last LMP was Patient's last menstrual period was 2022..    # 1 - Date: None, Sex: None, Weight: None, GA: None, Delivery: None, Apgar1: None, Apgar5: None, Living: None, Birth Comments: None    # 2 - Date: None, Sex: None, Weight: None, GA: None, Delivery: None, Apgar1: None, Apgar5: None, Living: None, Birth Comments: None    # 3 - Date: None, Sex: None, Weight: None, GA: None, Delivery: None, Apgar1: None, Apgar5: None, Living: None, Birth Comments: None    # 4 - Date: 17, Sex: Female, Weight: 3960 g (8 lb 11.7 oz), GA: 40w5d, Delivery: Vaginal, Spontaneous, Apgar1: 9, Apgar5: 9, Living: Living, Birth Comments: None    # 5 - Date: None, Sex: None, Weight: None, GA: None, Delivery: None, Apgar1: None, Apgar5: None, Living: None, Birth Comments: None      Current obstetric complaints : nausea   Initial positive test date : 2022     Location : home    Prior obstetric issues, AMA and Vaginal bleeding in first trimester  Potential pregnancy concerns: 2 MAB  Family history of genetic issues (includes FOB):   Prior infections concerning in pregnancy (Rash, fever in last 2 weeks): none  Varicella Hx -as child  Prior testing for Cystic Fibrosis Carrier or Sickle Cell Trait- none  Prepregnancy BMI - Body mass index is 40.74 kg/m².  Hx of HSV for patient or partner : no     Additional Pertinent History   Last Pap : 2016 negative  Last Completed Pap Smear     This patient has no relevant Health Maintenance data.        History of abnormal Pap smear: no  Family history of uterine, colon, breast, or ovarian cancer: no  Performs monthly Self-Breast Exam: yes  Exercises Regularly: no  Feelings of Anxiety or Depression: no  Tobacco Usage?: No     The additional following portions  of the patient's history were reviewed and updated as appropriate: problem list.    Review of Systems   Review of Systems  Constitutional : Nausea, fatigue yes nausea   : Vaginal bleeding, cramping light spotting x2 when wipes  Breast Tenderness : yes  All systems reviewed    /70   Wt 120 kg (264 lb)   LMP 04/06/2022   BMI 40.74 kg/m²     Physical Exam  General Appearance:    Alert, cooperative, in no acute distress   Head:    Normocephalic, without obvious abnormality, atraumatic   Eyes:            Lids and lashes normal, conjunctivae and sclerae normal, no icterus, no pallor, corneas clear   Ears:    Ears appear intact with no abnormalities noted       Neck:      Neck without masses or thyromegaly    Abdomen:    Soft without masses or tenderness   :           Neck:   No adenopathy, supple, trachea midline, no thyromegaly   Back:     No kyphosis present, no scoliosis present,                       Extremities:   Moves all extremities well, no edema, no cyanosis   Skin:   No bleeding, bruising or rash   Lymph nodes:   No palpable adenopathy   Neurologic:   Sensation intact, A&O times 3        Assessment & Plan   Assessment     Problem List Items Addressed This Visit    None     Visit Diagnoses     Pregnancy, unspecified gestational age        Relevant Orders    Obstetric Panel    HIV-1 / O / 2 Ag / Antibody 4th Generation    Urine Culture - Urine, Urine, Clean Catch    Urine Drug Screen - Urine, Clean Catch    LIQUID-BASED PAP SMEAR, [facility] ([facility])    POC Urinalysis Dipstick (Completed)          1. Pregnancy at 8w0d  2. H/O MAB x 3.  Size c/w dates today.  3. AMA    Plan     1. Reviewed routine prenatal care with the office and educational materials given  2. Counseled on genetic testing options including CF DNA, carrier testing including CF, SMA, and Fragile X,  and NT screening.  3. Follow up: 2 week(s)  4. H/O MAB x 3 - discussed options.  Given spotting do not rec. Starting baby asa at this  point.    5. AMA discussed genetic testing options.      Kristie Kwong MD  06/01/2022

## 2022-06-03 ENCOUNTER — TELEPHONE (OUTPATIENT)
Dept: OBSTETRICS AND GYNECOLOGY | Facility: CLINIC | Age: 37
End: 2022-06-03

## 2022-06-03 LAB
ABO GROUP BLD: ABNORMAL
AMPHETAMINES UR QL SCN: NEGATIVE NG/ML
BACTERIA UR CULT: NORMAL
BACTERIA UR CULT: NORMAL
BARBITURATES UR QL SCN: NEGATIVE NG/ML
BASOPHILS # BLD AUTO: 0.1 X10E3/UL (ref 0–0.2)
BASOPHILS NFR BLD AUTO: 1 %
BENZODIAZ UR QL SCN: NEGATIVE NG/ML
BLD GP AB SCN SERPL QL: NEGATIVE
BZE UR QL SCN: NEGATIVE NG/ML
CANNABINOIDS UR QL SCN: NEGATIVE NG/ML
CREAT UR-MCNC: 91.7 MG/DL (ref 20–300)
EOSINOPHIL # BLD AUTO: 0.2 X10E3/UL (ref 0–0.4)
EOSINOPHIL NFR BLD AUTO: 1 %
ERYTHROCYTE [DISTWIDTH] IN BLOOD BY AUTOMATED COUNT: 20.5 % (ref 11.7–15.4)
HBV SURFACE AG SERPL QL IA: NEGATIVE
HCT VFR BLD AUTO: 49.6 % (ref 34–46.6)
HCV AB S/CO SERPL IA: <0.1 S/CO RATIO (ref 0–0.9)
HGB BLD-MCNC: 15.6 G/DL (ref 11.1–15.9)
HIV 1+2 AB+HIV1 P24 AG SERPL QL IA: NON REACTIVE
IMM GRANULOCYTES # BLD AUTO: 0.1 X10E3/UL (ref 0–0.1)
IMM GRANULOCYTES NFR BLD AUTO: 1 %
LABORATORY COMMENT REPORT: NORMAL
LYMPHOCYTES # BLD AUTO: 1.9 X10E3/UL (ref 0.7–3.1)
LYMPHOCYTES NFR BLD AUTO: 17 %
MCH RBC QN AUTO: 25.9 PG (ref 26.6–33)
MCHC RBC AUTO-ENTMCNC: 31.5 G/DL (ref 31.5–35.7)
MCV RBC AUTO: 82 FL (ref 79–97)
METHADONE UR QL SCN: NEGATIVE NG/ML
MONOCYTES # BLD AUTO: 0.5 X10E3/UL (ref 0.1–0.9)
MONOCYTES NFR BLD AUTO: 4 %
NEUTROPHILS # BLD AUTO: 8.5 X10E3/UL (ref 1.4–7)
NEUTROPHILS NFR BLD AUTO: 76 %
OPIATES UR QL SCN: NEGATIVE NG/ML
OXYCODONE+OXYMORPHONE UR QL SCN: NEGATIVE NG/ML
PCP UR QL: NEGATIVE NG/ML
PH UR: 6 [PH] (ref 4.5–8.9)
PLATELET # BLD AUTO: 453 X10E3/UL (ref 150–450)
PROPOXYPH UR QL SCN: NEGATIVE NG/ML
RBC # BLD AUTO: 6.03 X10E6/UL (ref 3.77–5.28)
REF LAB TEST METHOD: NORMAL
RH BLD: POSITIVE
RPR SER QL: NON REACTIVE
RUBV IGG SERPL IA-ACNC: 1.93 INDEX
WBC # BLD AUTO: 11.2 X10E3/UL (ref 3.4–10.8)

## 2022-06-03 NOTE — TELEPHONE ENCOUNTER
Pt called and stated that she has started spotting and has passed a few clots. Pt is 8 wks pregnant and states that blood was bright red this morning but is now light pink. Please advise

## 2022-06-03 NOTE — TELEPHONE ENCOUNTER
SW pt. She reports vaginal spotting when wiping and passing of small clots occasionally. MBT O+. She denies passing of stringy tissue, heavy flow, and cramping. Education provided on increase of blood flow to cervix and uterus during pregnancy is likely cause. Since she had an US and PAP at [facility] this caused irritation to cervix and is likely cause of spotting. Instructed to rest, avoid IC, and proper hydration. If she had any further questions to call on call MD or if developed heavy vaginal bleeding and cramping to go to ED. Pt VU.

## 2022-06-06 ENCOUNTER — ROUTINE PRENATAL (OUTPATIENT)
Dept: OBSTETRICS AND GYNECOLOGY | Facility: CLINIC | Age: 37
End: 2022-06-06

## 2022-06-06 ENCOUNTER — TELEPHONE (OUTPATIENT)
Dept: OBSTETRICS AND GYNECOLOGY | Facility: CLINIC | Age: 37
End: 2022-06-06

## 2022-06-06 VITALS — DIASTOLIC BLOOD PRESSURE: 78 MMHG | WEIGHT: 264.8 LBS | BODY MASS INDEX: 40.86 KG/M2 | SYSTOLIC BLOOD PRESSURE: 122 MMHG

## 2022-06-06 DIAGNOSIS — Z3A.08 8 WEEKS GESTATION OF PREGNANCY: Primary | ICD-10-CM

## 2022-06-06 DIAGNOSIS — O20.9 BLEEDING IN EARLY PREGNANCY: ICD-10-CM

## 2022-06-06 LAB
BILIRUB BLD-MCNC: NEGATIVE MG/DL
CLARITY, POC: CLEAR
COLOR UR: YELLOW
GLUCOSE UR STRIP-MCNC: NEGATIVE MG/DL
KETONES UR QL: NEGATIVE
LEUKOCYTE EST, POC: ABNORMAL
NITRITE UR-MCNC: NEGATIVE MG/ML
PH UR: 6 [PH] (ref 5–8)
PROT UR STRIP-MCNC: ABNORMAL MG/DL
RBC # UR STRIP: ABNORMAL /UL
SP GR UR: 1.02 (ref 1–1.03)
UROBILINOGEN UR QL: NORMAL

## 2022-06-06 PROCEDURE — 0502F SUBSEQUENT PRENATAL CARE: CPT | Performed by: NURSE PRACTITIONER

## 2022-06-06 NOTE — PROGRESS NOTES
OB FOLLOW UP  CC- Here for care of pregnancy        Esperanza Waldron is a 36 y.o.  8w5d patient being seen today for her obstetrical follow up visit. Patient reports bleeding with blood clots, nausea, and tightheadness. Patient stated that the bleeding started on Friday and she experienced small clots the size of her finger nail. Patient stated that on  she experienced large clots the size of a quarter. Patient stated that she has been dry heaving. MBT= O positive.    Her prenatal care is complicated by (and status) :    Patient Active Problem List   Diagnosis   • Postpartum care following vaginal delivery       Flu Status: Declines  Ultrasound Today: Yes.  Findings showed single, viable IUP mesuring 8w5 consistent with LMP. FHR=  I have personally evaluated the U/S and agree with the findings. TREE Dickerson    ROS -   Patient Reports : Vaginal Spotting, Nausea and Tightness  Patient Denies: Loss of Fluid, Vaginal Spotting, Vision Changes, Headaches, Vomiting  and Contractions  Fetal Movement : Absent  All other systems reviewed and are negative.       The additional following portions of the patient's history were reviewed and updated as appropriate: allergies and current medications.    I have reviewed and agree with the HPI, ROS, and historical information as entered above. TREE Dickerson    /78   Wt 120 kg (264 lb 12.8 oz)   LMP 2022   BMI 40.86 kg/m²       EXAM:     FHT: 171 BPM   Uterine Size: Not measured  Pelvic Exam: NO    Urine glucose/protein: See prenatal flowsheet       Assessment and Plan    Problem List Items Addressed This Visit    None     Visit Diagnoses     8 weeks gestation of pregnancy    -  Primary    Relevant Orders    POC Urinalysis Dipstick (Completed)    Bleeding in early pregnancy        Relevant Orders    Urine Culture - Urine, Urine, Clean Catch          1. Pregnancy at 8w5d  2. Fetal status reassuring.   3. Ultrasound showed viable IUP with FHR=  171bpm, measuring 8w5d consistent with LMP  Advised pelvic rest, no intercourse, avoid heavy lifting or straining with BM  MBT= O positive  Call for increase pelvic pain or heavy bleeding  CCUA large leuks, large blood, urine sent for culture. Denies dysuria.  Keep next SAMINA appt. As scheduled.     Mine David, TREE  06/06/2022

## 2022-06-06 NOTE — TELEPHONE ENCOUNTER
PT has been having bleeding issues since she was in on 6/1 it was just spotting and then yesterday she had a gush of bright red blood and she states she passes a quarter size clot    Please advise

## 2022-06-06 NOTE — TELEPHONE ENCOUNTER
Dr. Kwong OB pt.   8w5d    S/w pt she states she had her NOB appt last Wednesday (6/1) and had pap smear during the appt and that night she had some vaginal spotting that was light and pink in color and from Thursday until yesterday she has had more vaginal bleeding w/ 1/2 size dollar vaginal clots present that have been Intermittent. States she is also nauseous and dry heaving as well.     Patient denies fever, severe cramping    MBT O+    Per Anabella: patient needs to come in for U/S and evaluation.     S/w pt she v/u.     U/S order in and appt scheduled.

## 2022-06-07 ENCOUNTER — TELEPHONE (OUTPATIENT)
Dept: OBSTETRICS AND GYNECOLOGY | Facility: CLINIC | Age: 37
End: 2022-06-07

## 2022-06-07 ENCOUNTER — OFFICE VISIT (OUTPATIENT)
Dept: OBSTETRICS AND GYNECOLOGY | Facility: CLINIC | Age: 37
End: 2022-06-07

## 2022-06-07 VITALS — SYSTOLIC BLOOD PRESSURE: 118 MMHG | BODY MASS INDEX: 40.52 KG/M2 | WEIGHT: 262.6 LBS | DIASTOLIC BLOOD PRESSURE: 80 MMHG

## 2022-06-07 DIAGNOSIS — O20.0 THREATENED ABORTION: Primary | ICD-10-CM

## 2022-06-07 DIAGNOSIS — O02.1 MISSED ABORTION: Primary | ICD-10-CM

## 2022-06-07 DIAGNOSIS — Z3A.08 8 WEEKS GESTATION OF PREGNANCY: ICD-10-CM

## 2022-06-07 PROCEDURE — 99213 OFFICE O/P EST LOW 20 MIN: CPT | Performed by: NURSE PRACTITIONER

## 2022-06-07 NOTE — TELEPHONE ENCOUNTER
Per LOS needs u/s and eval in office today. Patient notified. Scheduled at 4:30. States bleeding continues but lighter.

## 2022-06-07 NOTE — TELEPHONE ENCOUNTER
. In office yesterday for bleeding/clotting. +FHT on u/s 8w5d. Patient states woke up last night with cramping, then 'gush' of blood around 12 AM, heavy bleeding/clotting/passing tissue x4 hours. Now bleeding has decreased and denies pain.     MBT O+.     Patient scheduled for f/u . Will discuss with LOS and cb with plan. She vu. Patient to cb if saturating pad <1H or severe pain. She vu.

## 2022-06-07 NOTE — TELEPHONE ENCOUNTER
Pt called stating that she believes she has now fully miscarried and was asking what she needs to do. Please advise

## 2022-06-07 NOTE — PROGRESS NOTES
Chief Complaint   Patient presents with   • Miscarriage          HPI  Esperanza Waldron is a 36 y.o. female, , who presents with bleeding.  The amount of bleeding is described as heavy for 1 days with clots starting last night..    Recent Tests:  She had an US yesterday with + FHT's  US today: Yes.  Findings showed no GS seen, thickened endometrium still present.   She has had prenatal care.  Her past medical history is notable for spontaneous .  She has passage of tissue.  Rh Status: Positive  She reports no additional symptoms or complaints.    The additional following portions of the patient's history were reviewed and updated as appropriate: allergies, current medications, past family history, past medical history, past social history, past surgical history and problem list.    Review of Systems   Constitutional: Negative.    HENT: Negative.    Eyes: Negative.    Respiratory: Negative.    Cardiovascular: Negative.    Gastrointestinal: Negative.    Endocrine: Negative.    Genitourinary: Positive for vaginal bleeding.   Musculoskeletal: Negative.    Skin: Negative.    Allergic/Immunologic: Negative.    Neurological: Negative.    Hematological: Negative.    Psychiatric/Behavioral: Negative.      All other systems reviewed and are negative.     I have reviewed and agree with the HPI, ROS, and historical information as entered above. Nida Mane, APRN    Objective   /80   Wt 119 kg (262 lb 9.6 oz)   LMP 2022   BMI 40.52 kg/m²     Physical Exam  Vitals and nursing note reviewed.   Constitutional:       Appearance: Normal appearance.   HENT:      Head: Normocephalic and atraumatic.   Pulmonary:      Effort: Pulmonary effort is normal.   Neurological:      Mental Status: She is alert and oriented to person, place, and time.   Psychiatric:         Behavior: Behavior normal.            Assessment and Plan    Problem List Items Addressed This Visit    None     Visit Diagnoses     Missed      -  Primary    Relevant Orders    HCG, B-subunit, Quantitative          1. Complete AB- Reviewed U/S, no gestational sac present on today's ultrasound.   2. MBT O positive- rhogam not indicated.   3. Check HCG today and follow to zero.   4. Bleeding precautions reviewed.   5. Call with any questions or concerns.       Nida Mane, APRN  2022

## 2022-06-08 LAB
BACTERIA UR CULT: NORMAL
BACTERIA UR CULT: NORMAL
HCG INTACT+B SERPL-ACNC: NORMAL MIU/ML

## 2022-06-10 ENCOUNTER — OFFICE VISIT (OUTPATIENT)
Dept: OBSTETRICS AND GYNECOLOGY | Facility: CLINIC | Age: 37
End: 2022-06-10

## 2022-06-10 VITALS — HEIGHT: 68 IN | BODY MASS INDEX: 40.52 KG/M2 | SYSTOLIC BLOOD PRESSURE: 118 MMHG | DIASTOLIC BLOOD PRESSURE: 75 MMHG

## 2022-06-10 DIAGNOSIS — O03.9 SPONTANEOUS ABORTION: Primary | ICD-10-CM

## 2022-06-10 DIAGNOSIS — O02.1 MISSED ABORTION: Primary | ICD-10-CM

## 2022-06-10 LAB — HCG INTACT+B SERPL-ACNC: NORMAL MIU/ML

## 2022-06-10 PROCEDURE — 99213 OFFICE O/P EST LOW 20 MIN: CPT | Performed by: NURSE PRACTITIONER

## 2022-06-10 RX ORDER — CEPHALEXIN 500 MG/1
500 CAPSULE ORAL 4 TIMES DAILY
Qty: 28 CAPSULE | Refills: 0 | Status: SHIPPED | OUTPATIENT
Start: 2022-06-10 | End: 2022-06-17

## 2022-06-10 NOTE — PROGRESS NOTES
Chief Complaint   Patient presents with   • Follow-up     [facility]  Esperanza Waldron is a 36 y.o. female, , who presents for follow up evaluation and US of [facility]. She was last seen on 22 with reports of heavy bleeding, passage of tissue and TVUS that showed no GS and thickened endometrium still present. On 22 she was seen with US that showed viable IUP at 8w5d and + FHT.    Recent Tests:  She had a urine pregnancy test that was done on 22 that was positive.   US today: Yes.  Findings showed no GS, no YS, no embryo, no cardiac activity, thickened endometrium with endometrial thickness of 15.4 mm.  I have personally evaluated the U/S and agree with the findings. TREE Diaz  She has had prenatal care.  She complains of cramping and intermittent sharp pain.  The pain is located in her lower pelvis. She reports that she has bleeding that slows and then picks back up. She reports she passed some clots yesterday. Her past medical history is notable for spontaneous  x2 and 2 chemical pregnancies.  She has had passage of tissue.  Rh Status: Positive  She reports she had fever, chills and nausea last night. She took Tylenol and the fever broke around midnight. She also reports urinary urgency and feeling of bladder pressure.    The additional following portions of the patient's history were reviewed and updated as appropriate: allergies, current medications, past family history, past medical history, past social history, past surgical history and problem list.    Review of Systems   Constitutional: Positive for fever.   Respiratory: Negative.    Cardiovascular: Negative.    Gastrointestinal: Negative.    Genitourinary: Positive for pelvic pressure, urgency and vaginal bleeding.        Cramping   Psychiatric/Behavioral: Negative.      All other systems reviewed and are negative.     I have reviewed and agree with the HPI, ROS, and historical information as entered above. Mary SAUNDERS  "Ryan, APRN    Objective   /75   Ht 171.5 cm (67.5\")   LMP 2022   BMI 40.52 kg/m²     Physical Exam  Constitutional:       Appearance: Normal appearance.   Pulmonary:      Effort: Pulmonary effort is normal.   Abdominal:      Palpations: Abdomen is soft.      Tenderness: There is no abdominal tenderness.   Neurological:      Mental Status: She is alert.            Assessment and Plan    Problem List Items Addressed This Visit    None     Visit Diagnoses     Spontaneous     -  Primary    Relevant Orders    HCG, B-subunit, Quantitative        U/S today reveals no evidence of IUP, endometrium-15.4mm. She reports bleeding has slowed but still comes and goes. She has had no fever since last pm. Advised to call with fever >100.4, bleeding > 1 pad per hour, severe pain. The patient's urine sample could not be located after the patient left today.    1. SAB  Pelvic Rest.  No douching, intercourse or use of tampons.  Call for an increase in bleeding, abdominal pain, or fever.  Follow Up: Return if symptoms worsen or fail to improve, for Annual physical.   Follow HCG weekly to negative. 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 She declines testing for recurrent AB at this time.   Declines resources for  bereavement.   Keflex qid x 7 for possible UTI though culture could not be sent.     Counseling was given to patient and family for the following topics: instructions for management, risk factor reductions and prognosis . Total time of the encounter was 20  minutes and 10 minutes was spend counseling.      Mary Davis, APRN  06/10/2022  "

## 2022-06-13 DIAGNOSIS — O03.9 SPONTANEOUS ABORTION: Primary | ICD-10-CM

## 2023-08-10 ENCOUNTER — OFFICE VISIT (OUTPATIENT)
Dept: OBSTETRICS AND GYNECOLOGY | Facility: CLINIC | Age: 38
End: 2023-08-10
Payer: COMMERCIAL

## 2023-08-10 ENCOUNTER — TELEPHONE (OUTPATIENT)
Dept: OBSTETRICS AND GYNECOLOGY | Facility: CLINIC | Age: 38
End: 2023-08-10
Payer: COMMERCIAL

## 2023-08-10 VITALS
WEIGHT: 263.8 LBS | HEIGHT: 68 IN | SYSTOLIC BLOOD PRESSURE: 132 MMHG | BODY MASS INDEX: 39.98 KG/M2 | DIASTOLIC BLOOD PRESSURE: 90 MMHG

## 2023-08-10 DIAGNOSIS — O20.9 VAGINAL BLEEDING AFFECTING EARLY PREGNANCY: ICD-10-CM

## 2023-08-10 DIAGNOSIS — R30.0 DYSURIA: Primary | ICD-10-CM

## 2023-08-10 DIAGNOSIS — O20.0 THREATENED ABORTION: ICD-10-CM

## 2023-08-10 LAB
BILIRUB BLD-MCNC: NEGATIVE MG/DL
CLARITY, POC: CLEAR
COLOR UR: YELLOW
GLUCOSE UR STRIP-MCNC: NEGATIVE MG/DL
KETONES UR QL: NEGATIVE
LEUKOCYTE EST, POC: ABNORMAL
NITRITE UR-MCNC: NEGATIVE MG/ML
PH UR: 6 [PH] (ref 5–8)
PROT UR STRIP-MCNC: NEGATIVE MG/DL
RBC # UR STRIP: ABNORMAL /UL
SP GR UR: 1.01 (ref 1–1.03)
UROBILINOGEN UR QL: ABNORMAL

## 2023-08-10 RX ORDER — PROGESTERONE 100 MG/1
100 CAPSULE ORAL DAILY
Qty: 30 CAPSULE | Refills: 2 | Status: SHIPPED | OUTPATIENT
Start: 2023-08-10

## 2023-08-10 RX ORDER — CEPHALEXIN 500 MG/1
500 CAPSULE ORAL 4 TIMES DAILY
Qty: 28 CAPSULE | Refills: 0 | Status: SHIPPED | OUTPATIENT
Start: 2023-08-10 | End: 2023-08-17

## 2023-08-10 NOTE — PROGRESS NOTES
"            Chief Complaint   Patient presents with    suspected UTI       Subjective   HPI  Esperanza Waldron is a 37 y.o. female, .  Patient's last menstrual period was 2023 (exact date).. who presents for a suspected uti.    She had a +UPT .  She has long cycles--- 30+ days.  She reports urgency and dysuria, hematuria, itching, and cloudy urine, also cramping and back pain which she thinks is related to the UTI.  C/o of light spotting, beginning yesterday.  She denies recent intercourse, heavy lifting, straining.      MBT +      Additional OB/GYN History     Last Pap : 22  Last Completed Pap Smear            PAP SMEAR (Every 3 Years) Next due on 2022  LIQUID-BASED PAP SMEAR, [facility] ([facility])                  Tobacco Usage?: No   OB History          7    Para   1    Term   1       0    AB   4    Living   1         SAB   3    IAB   0    Ectopic   0    Molar        Multiple   0    Live Births   1                  Current Outpatient Medications:     cephalexin (Keflex) 500 MG capsule, Take 1 capsule by mouth 4 (Four) Times a Day for 7 days., Disp: 28 capsule, Rfl: 0    Prenatal Vit-Fe Fumarate-FA (PRENATAL 27-1) 27-1 MG tablet tablet, Take 1 tablet by mouth Every Other Day., Disp: , Rfl:     Progesterone (Prometrium) 100 MG capsule, Take 1 capsule by mouth Daily., Disp: 30 capsule, Rfl: 2     Past Medical History:   Diagnosis Date    History of recurrent miscarriages     X 3        History reviewed. No pertinent surgical history.    The additional following portions of the patient's history were reviewed and updated as appropriate: allergies, current medications, past family history, past medical history, past social history, past surgical history, and problem list.    Review of Systems    I have reviewed and agree with the HPI, ROS, and historical information as entered above. Rola Sanchez, APRN      Objective   /90   Ht 171.5 cm (67.5\")   Wt 120 " kg (263 lb 12.8 oz)   LMP 2023 (Exact Date)   Breastfeeding No   BMI 40.71 kg/mý     Physical Exam  Vitals and nursing note reviewed.   Constitutional:       General: She is not in acute distress.     Appearance: Normal appearance. She is obese. She is not ill-appearing.   Pulmonary:      Effort: Pulmonary effort is normal. No respiratory distress.   Skin:     General: Skin is warm and dry.   Neurological:      Mental Status: She is alert and oriented to person, place, and time.   Psychiatric:         Mood and Affect: Mood normal.         Behavior: Behavior normal.       Assessment & Plan     Assessment     Problem List Items Addressed This Visit    None  Visit Diagnoses       Dysuria    -  Primary    Relevant Medications    cephalexin (Keflex) 500 MG capsule    Other Relevant Orders    POC Urinalysis Dipstick (Completed)    Urine Culture - Urine, Urine, Clean Catch    Threatened         Relevant Medications    Progesterone (Prometrium) 100 MG capsule    Other Relevant Orders    US Ob Transvaginal    Vaginal bleeding affecting early pregnancy        Relevant Medications    Progesterone (Prometrium) 100 MG capsule    Other Relevant Orders    US Ob Transvaginal            Plan     Return for US FU. 1-2 wks  2.   D/w pt US today shows a GS at 5w 5d, no YS, fetal pole.  Since she has long cycles, it could be an early IUP.  Start progesterone per vagina and continue PNV.  Pelvic rest.  Call prn worsening symptoms.        Rola Sanchez, APRN  08/10/2023

## 2023-08-10 NOTE — TELEPHONE ENCOUNTER
Caller: Esperanza Waldron    Relationship: Self    Best call back number: [phone]    What was the call regarding: PT CALLED IN BECAUSE SHE HAS A POSITIVE AT HOME PREGNANCY TEST, LMP: 06/23.     PT ALSO HAS A POSITIVE AT HOME UTI TEST.     UNSURE HOW QUICKLY SHE SHOULD BE SEEN

## 2023-08-10 NOTE — TELEPHONE ENCOUNTER
LMP 6/23 with 34-35d cycles. First + UPT was 7/28. She reports urgency and dysuria, hematuria, itching, and cloudy urine. She also reports cramping which has been worse than her previous MABs which she thinks is related to the UTI. Informed I would discuss with NP and call back. She VU

## 2023-08-12 LAB
BACTERIA UR CULT: NORMAL
BACTERIA UR CULT: NORMAL

## 2024-01-03 ENCOUNTER — APPOINTMENT (OUTPATIENT)
Dept: MRI IMAGING | Facility: HOSPITAL | Age: 39
DRG: 442 | End: 2024-01-03
Payer: COMMERCIAL

## 2024-01-03 ENCOUNTER — HOSPITAL ENCOUNTER (INPATIENT)
Facility: HOSPITAL | Age: 39
LOS: 1 days | Discharge: HOME | DRG: 442 | End: 2024-01-05
Attending: EMERGENCY MEDICINE | Admitting: HOSPITALIST
Payer: COMMERCIAL

## 2024-01-03 DIAGNOSIS — R16.1 SPLENOMEGALY: ICD-10-CM

## 2024-01-03 DIAGNOSIS — O20.0 THREATENED ABORTION: ICD-10-CM

## 2024-01-03 DIAGNOSIS — R93.5 ABNORMAL CT OF THE ABDOMEN: ICD-10-CM

## 2024-01-03 DIAGNOSIS — K86.9 PANCREATIC ENLARGEMENT: ICD-10-CM

## 2024-01-03 DIAGNOSIS — O20.9 VAGINAL BLEEDING AFFECTING EARLY PREGNANCY: ICD-10-CM

## 2024-01-03 DIAGNOSIS — R10.10 UPPER ABDOMINAL PAIN: Primary | ICD-10-CM

## 2024-01-03 DIAGNOSIS — R52 INTRACTABLE PAIN: ICD-10-CM

## 2024-01-03 DIAGNOSIS — Z78.9 FAILURE OF OUTPATIENT TREATMENT: ICD-10-CM

## 2024-01-03 PROBLEM — E66.01 MORBID OBESITY: Status: ACTIVE | Noted: 2024-01-03

## 2024-01-03 PROBLEM — R10.9 ABDOMINAL PAIN: Status: ACTIVE | Noted: 2024-01-03

## 2024-01-03 PROBLEM — K85.90 PANCREATITIS: Status: ACTIVE | Noted: 2024-01-03

## 2024-01-03 LAB
ALBUMIN SERPL-MCNC: 3.9 G/DL (ref 3.5–5.2)
ALBUMIN/GLOB SERPL: 1.3 G/DL
ALP SERPL-CCNC: 179 U/L (ref 39–117)
ALT SERPL W P-5'-P-CCNC: 46 U/L (ref 1–33)
ANION GAP SERPL CALCULATED.3IONS-SCNC: 13 MMOL/L (ref 5–15)
ANISOCYTOSIS BLD QL: NORMAL
AST SERPL-CCNC: 41 U/L (ref 1–32)
B-HCG UR QL: NEGATIVE
BACTERIA UR QL AUTO: ABNORMAL /HPF
BASOPHILS # BLD AUTO: 0.08 10*3/MM3 (ref 0–0.2)
BASOPHILS NFR BLD AUTO: 0.9 % (ref 0–1.5)
BILIRUB SERPL-MCNC: 1 MG/DL (ref 0–1.2)
BILIRUB UR QL STRIP: NEGATIVE
BUN SERPL-MCNC: 9 MG/DL (ref 6–20)
BUN/CREAT SERPL: 11.5 (ref 7–25)
CALCIUM SPEC-SCNC: 8.7 MG/DL (ref 8.6–10.5)
CHLORIDE SERPL-SCNC: 101 MMOL/L (ref 98–107)
CLARITY UR: ABNORMAL
CO2 SERPL-SCNC: 22 MMOL/L (ref 22–29)
COLOR UR: YELLOW
CREAT SERPL-MCNC: 0.78 MG/DL (ref 0.57–1)
DACRYOCYTES BLD QL SMEAR: NORMAL
DEPRECATED RDW RBC AUTO: 54.6 FL (ref 37–54)
EGFRCR SERPLBLD CKD-EPI 2021: 99.2 ML/MIN/1.73
EOSINOPHIL # BLD AUTO: 0.12 10*3/MM3 (ref 0–0.4)
EOSINOPHIL NFR BLD AUTO: 1.3 % (ref 0.3–6.2)
ERYTHROCYTE [DISTWIDTH] IN BLOOD BY AUTOMATED COUNT: 20.9 % (ref 12.3–15.4)
EXPIRATION DATE: NORMAL
GLOBULIN UR ELPH-MCNC: 2.9 GM/DL
GLUCOSE SERPL-MCNC: 98 MG/DL (ref 65–99)
GLUCOSE UR STRIP-MCNC: NEGATIVE MG/DL
HCT VFR BLD AUTO: 38.3 % (ref 34–46.6)
HGB BLD-MCNC: 11.8 G/DL (ref 12–15.9)
HGB UR QL STRIP.AUTO: NEGATIVE
HOLD SPECIMEN: NORMAL
HYALINE CASTS UR QL AUTO: ABNORMAL /LPF
HYPOCHROMIA BLD QL: NORMAL
IMM GRANULOCYTES # BLD AUTO: 0.07 10*3/MM3 (ref 0–0.05)
IMM GRANULOCYTES NFR BLD AUTO: 0.8 % (ref 0–0.5)
INTERNAL NEGATIVE CONTROL: NEGATIVE
INTERNAL POSITIVE CONTROL: POSITIVE
KETONES UR QL STRIP: NEGATIVE
LEUKOCYTE ESTERASE UR QL STRIP.AUTO: ABNORMAL
LIPASE SERPL-CCNC: 27 U/L (ref 13–60)
LYMPHOCYTES # BLD AUTO: 1.41 10*3/MM3 (ref 0.7–3.1)
LYMPHOCYTES NFR BLD AUTO: 15.8 % (ref 19.6–45.3)
Lab: NORMAL
MCH RBC QN AUTO: 23.2 PG (ref 26.6–33)
MCHC RBC AUTO-ENTMCNC: 30.8 G/DL (ref 31.5–35.7)
MCV RBC AUTO: 75.2 FL (ref 79–97)
MICROCYTES BLD QL: NORMAL
MONOCYTES # BLD AUTO: 0.66 10*3/MM3 (ref 0.1–0.9)
MONOCYTES NFR BLD AUTO: 7.4 % (ref 5–12)
NEUTROPHILS NFR BLD AUTO: 6.61 10*3/MM3 (ref 1.7–7)
NEUTROPHILS NFR BLD AUTO: 73.8 % (ref 42.7–76)
NITRITE UR QL STRIP: NEGATIVE
NRBC BLD AUTO-RTO: 0 /100 WBC (ref 0–0.2)
OVALOCYTES BLD QL SMEAR: NORMAL
PH UR STRIP.AUTO: 5.5 [PH] (ref 5–8)
PLAT MORPH BLD: NORMAL
PLATELET # BLD AUTO: 333 10*3/MM3 (ref 140–450)
POTASSIUM SERPL-SCNC: 3.7 MMOL/L (ref 3.5–5.2)
PROT SERPL-MCNC: 6.8 G/DL (ref 6–8.5)
PROT UR QL STRIP: NEGATIVE
RBC # BLD AUTO: 5.09 10*6/MM3 (ref 3.77–5.28)
RBC # UR STRIP: ABNORMAL /HPF
REF LAB TEST METHOD: ABNORMAL
SODIUM SERPL-SCNC: 136 MMOL/L (ref 136–145)
SP GR UR STRIP: 1.01 (ref 1–1.03)
SQUAMOUS #/AREA URNS HPF: ABNORMAL /HPF
TRIGL SERPL-MCNC: 93 MG/DL (ref 0–150)
UROBILINOGEN UR QL STRIP: ABNORMAL
WBC # UR STRIP: ABNORMAL /HPF
WBC MORPH BLD: NORMAL
WBC NRBC COR # BLD AUTO: 8.95 10*3/MM3 (ref 3.4–10.8)
WHOLE BLOOD HOLD COAG: NORMAL
WHOLE BLOOD HOLD SPECIMEN: NORMAL

## 2024-01-03 PROCEDURE — 83690 ASSAY OF LIPASE: CPT | Performed by: EMERGENCY MEDICINE

## 2024-01-03 PROCEDURE — 99285 EMERGENCY DEPT VISIT HI MDM: CPT

## 2024-01-03 PROCEDURE — 81001 URINALYSIS AUTO W/SCOPE: CPT | Performed by: EMERGENCY MEDICINE

## 2024-01-03 PROCEDURE — 25010000002 HYDROMORPHONE PER 4 MG: Performed by: INTERNAL MEDICINE

## 2024-01-03 PROCEDURE — 74181 MRI ABDOMEN W/O CONTRAST: CPT

## 2024-01-03 PROCEDURE — 80053 COMPREHEN METABOLIC PANEL: CPT | Performed by: EMERGENCY MEDICINE

## 2024-01-03 PROCEDURE — 25810000003 SODIUM CHLORIDE 0.9 % SOLUTION: Performed by: INTERNAL MEDICINE

## 2024-01-03 PROCEDURE — 84478 ASSAY OF TRIGLYCERIDES: CPT | Performed by: INTERNAL MEDICINE

## 2024-01-03 PROCEDURE — 81025 URINE PREGNANCY TEST: CPT | Performed by: EMERGENCY MEDICINE

## 2024-01-03 PROCEDURE — G0378 HOSPITAL OBSERVATION PER HR: HCPCS

## 2024-01-03 PROCEDURE — 25010000002 ONDANSETRON PER 1 MG: Performed by: INTERNAL MEDICINE

## 2024-01-03 PROCEDURE — 85007 BL SMEAR W/DIFF WBC COUNT: CPT | Performed by: EMERGENCY MEDICINE

## 2024-01-03 PROCEDURE — 85025 COMPLETE CBC W/AUTO DIFF WBC: CPT | Performed by: EMERGENCY MEDICINE

## 2024-01-03 PROCEDURE — 99222 1ST HOSP IP/OBS MODERATE 55: CPT | Performed by: INTERNAL MEDICINE

## 2024-01-03 RX ORDER — ACETAMINOPHEN 325 MG/1
650 TABLET ORAL EVERY 4 HOURS PRN
Status: DISCONTINUED | OUTPATIENT
Start: 2024-01-03 | End: 2024-01-05 | Stop reason: HOSPADM

## 2024-01-03 RX ORDER — SODIUM CHLORIDE 0.9 % (FLUSH) 0.9 %
10 SYRINGE (ML) INJECTION EVERY 12 HOURS SCHEDULED
Status: DISCONTINUED | OUTPATIENT
Start: 2024-01-03 | End: 2024-01-05 | Stop reason: HOSPADM

## 2024-01-03 RX ORDER — HYDROMORPHONE HYDROCHLORIDE 1 MG/ML
0.5 INJECTION, SOLUTION INTRAMUSCULAR; INTRAVENOUS; SUBCUTANEOUS
Status: DISCONTINUED | OUTPATIENT
Start: 2024-01-03 | End: 2024-01-05

## 2024-01-03 RX ORDER — ONDANSETRON 2 MG/ML
4 INJECTION INTRAMUSCULAR; INTRAVENOUS EVERY 6 HOURS PRN
Status: DISCONTINUED | OUTPATIENT
Start: 2024-01-03 | End: 2024-01-05 | Stop reason: HOSPADM

## 2024-01-03 RX ORDER — SODIUM CHLORIDE 9 MG/ML
100 INJECTION, SOLUTION INTRAVENOUS CONTINUOUS
Status: DISCONTINUED | OUTPATIENT
Start: 2024-01-03 | End: 2024-01-05 | Stop reason: HOSPADM

## 2024-01-03 RX ORDER — BISACODYL 5 MG/1
5 TABLET, DELAYED RELEASE ORAL DAILY PRN
Status: DISCONTINUED | OUTPATIENT
Start: 2024-01-03 | End: 2024-01-05 | Stop reason: HOSPADM

## 2024-01-03 RX ORDER — SODIUM CHLORIDE 9 MG/ML
10 INJECTION INTRAVENOUS AS NEEDED
Status: DISCONTINUED | OUTPATIENT
Start: 2024-01-03 | End: 2024-01-05 | Stop reason: HOSPADM

## 2024-01-03 RX ORDER — NALOXONE HCL 0.4 MG/ML
0.4 VIAL (ML) INJECTION
Status: DISCONTINUED | OUTPATIENT
Start: 2024-01-03 | End: 2024-01-05

## 2024-01-03 RX ORDER — ACETAMINOPHEN 650 MG/1
650 SUPPOSITORY RECTAL EVERY 4 HOURS PRN
Status: DISCONTINUED | OUTPATIENT
Start: 2024-01-03 | End: 2024-01-05 | Stop reason: HOSPADM

## 2024-01-03 RX ORDER — ONDANSETRON 2 MG/ML
4 INJECTION INTRAMUSCULAR; INTRAVENOUS ONCE
Status: CANCELLED | OUTPATIENT
Start: 2024-01-03 | End: 2024-01-03

## 2024-01-03 RX ORDER — AMOXICILLIN 250 MG
2 CAPSULE ORAL 2 TIMES DAILY
Status: DISCONTINUED | OUTPATIENT
Start: 2024-01-03 | End: 2024-01-05 | Stop reason: HOSPADM

## 2024-01-03 RX ORDER — HYDROCODONE BITARTRATE AND ACETAMINOPHEN 7.5; 325 MG/1; MG/1
1 TABLET ORAL EVERY 4 HOURS PRN
Status: DISCONTINUED | OUTPATIENT
Start: 2024-01-03 | End: 2024-01-05 | Stop reason: HOSPADM

## 2024-01-03 RX ORDER — ACETAMINOPHEN 160 MG/5ML
650 SOLUTION ORAL EVERY 4 HOURS PRN
Status: DISCONTINUED | OUTPATIENT
Start: 2024-01-03 | End: 2024-01-05 | Stop reason: HOSPADM

## 2024-01-03 RX ORDER — BISACODYL 10 MG
10 SUPPOSITORY, RECTAL RECTAL DAILY PRN
Status: DISCONTINUED | OUTPATIENT
Start: 2024-01-03 | End: 2024-01-05 | Stop reason: HOSPADM

## 2024-01-03 RX ORDER — POLYETHYLENE GLYCOL 3350 17 G/17G
17 POWDER, FOR SOLUTION ORAL DAILY PRN
Status: DISCONTINUED | OUTPATIENT
Start: 2024-01-03 | End: 2024-01-05 | Stop reason: HOSPADM

## 2024-01-03 RX ORDER — SODIUM CHLORIDE 0.9 % (FLUSH) 0.9 %
10 SYRINGE (ML) INJECTION AS NEEDED
Status: DISCONTINUED | OUTPATIENT
Start: 2024-01-03 | End: 2024-01-05 | Stop reason: HOSPADM

## 2024-01-03 RX ORDER — SODIUM CHLORIDE 9 MG/ML
40 INJECTION, SOLUTION INTRAVENOUS AS NEEDED
Status: DISCONTINUED | OUTPATIENT
Start: 2024-01-03 | End: 2024-01-05 | Stop reason: HOSPADM

## 2024-01-03 RX ADMIN — ACETAMINOPHEN 650 MG: 650 SUPPOSITORY RECTAL at 15:35

## 2024-01-03 RX ADMIN — Medication 10 ML: at 14:33

## 2024-01-03 RX ADMIN — ONDANSETRON 4 MG: 2 INJECTION INTRAMUSCULAR; INTRAVENOUS at 23:23

## 2024-01-03 RX ADMIN — HYDROMORPHONE HYDROCHLORIDE 0.5 MG: 1 INJECTION, SOLUTION INTRAMUSCULAR; INTRAVENOUS; SUBCUTANEOUS at 23:24

## 2024-01-03 RX ADMIN — SODIUM CHLORIDE 100 ML/HR: 9 INJECTION, SOLUTION INTRAVENOUS at 14:33

## 2024-01-03 RX ADMIN — HYDROMORPHONE HYDROCHLORIDE 0.5 MG: 1 INJECTION, SOLUTION INTRAMUSCULAR; INTRAVENOUS; SUBCUTANEOUS at 14:33

## 2024-01-03 NOTE — ED NOTES
Esperanza Waldron    Nursing Report ED to Floor:  Mental status: a/o x4  Ambulatory status: independent  Oxygen Therapy:  RA  Cardiac Rhythm: NSR  Admitted from: ED  Safety Concerns:  none  Social Issues: none  ED Room #:  17    ED Nurse Phone Extension - [phone] or may call [phone].      HPI:   Chief Complaint   Patient presents with    Abdominal Pain       Past Medical History:  Past Medical History:   Diagnosis Date    History of recurrent miscarriages     X 3        Past Surgical History:  No past surgical history on file.     Admitting Doctor:   No admitting provider for patient encounter.    Consulting Provider(s):  Consults       No orders found from 12/5/2023 to 1/4/2024.             Admitting Diagnosis:   There were no encounter diagnoses.    Most Recent Vitals:   Vitals:    01/03/24 1012 01/03/24 1030 01/03/24 1100 01/03/24 1132   BP: 130/84 122/71 117/78 118/77   BP Location:       Patient Position:       Pulse: 90 85 86 91   Resp:       Temp:       TempSrc:       SpO2: 95% 93% 94% 97%   Weight:       Height:           Active LDAs/IV Access:   Lines, Drains & Airways       Active LDAs       Name Placement date Placement time Site Days    Peripheral IV 01/03/24 Right Antecubital 01/03/24  --  Antecubital  less than 1                    Labs (abnormal labs have a star):   Labs Reviewed   COMPREHENSIVE METABOLIC PANEL - Abnormal; Notable for the following components:       Result Value    ALT (SGPT) 46 (*)     AST (SGOT) 41 (*)     Alkaline Phosphatase 179 (*)     All other components within normal limits    Narrative:     GFR Normal >60  Chronic Kidney Disease <60  Kidney Failure <15     URINALYSIS W/ MICROSCOPIC IF INDICATED (NO CULTURE) - Abnormal; Notable for the following components:    Appearance, UA Cloudy (*)     Leuk Esterase, UA Trace (*)     All other components within normal limits   CBC WITH AUTO DIFFERENTIAL - Abnormal; Notable for the following components:    Hemoglobin 11.8 (*)     MCV 75.2 (*)      MCH 23.2 (*)     MCHC 30.8 (*)     RDW 20.9 (*)     RDW-SD 54.6 (*)     Lymphocyte % 15.8 (*)     Immature Grans % 0.8 (*)     Immature Grans, Absolute 0.07 (*)     All other components within normal limits    Narrative:     Verified by repeat analysis.  MPV not calculated by analyzer.  Appended report. These results have been appended to a previously verified report.   URINALYSIS, MICROSCOPIC ONLY - Abnormal; Notable for the following components:    RBC, UA 11-20 (*)     WBC, UA 11-20 (*)     Bacteria, UA 1+ (*)     Squamous Epithelial Cells, UA 13-20 (*)     All other components within normal limits   LIPASE - Normal   POCT PEFORM URINE PREGNANCY - Normal   SCAN SLIDE   RAINBOW DRAW    Narrative:     The following orders were created for panel order [place] Draw.  Procedure                               Abnormality         Status                     ---------                               -----------         ------                     Green Top (Gel)[[id number]]                                  Final result               Lavender Top[[id number]]                                     Final result               Gold Top - SST[[id number]]                                   Final result               Reid Top[[id number]]                                         In process                 Light Blue Top[[id number]]                                   Final result                 Please view results for these tests on the individual orders.   CBC AND DIFFERENTIAL    Narrative:     The following orders were created for panel order CBC & Differential.  Procedure                               Abnormality         Status                     ---------                               -----------         ------                     CBC Auto Differential[[id number]]        Abnormal            Final result               Scan Slide[[id number]]                                       Final result                 Please view results for these tests  on the individual orders.   GREEN TOP   LAVENDER TOP   GOLD TOP - SST   LIGHT BLUE TOP   GRAY TOP       Meds Given in ED:   Medications   Sodium Chloride (PF) 0.9 % 10 mL (has no administration in time range)

## 2024-01-03 NOTE — PLAN OF CARE
Goal Outcome Evaluation:              Outcome Evaluation: pt has been stable throughout the shift. family at bedside. pt has been npo since 0800 this AM. remains NPO in anticipation of possible MRI. No other concerns noted.

## 2024-01-03 NOTE — H&P
[facility] Medicine Services  HISTORY AND PHYSICAL    Patient Name: Esperanza Waldron  : 1985  MRN: [id number]  Primary Care Physician: Provider, No Known  Date of admission: 1/3/2024      Subjective   Subjective     Chief Complaint:  Abdominal pain    HPI:  Esperanza Waldron is a 38 y.o. female with history of recurrent miscarriages, obesity, and several month history of intermittent abdominal pain who presented to the ED with abdominal pain that began a few days ago.  Pain is in the epigastric region and radiates to her back.  This time was associated with nausea and vomiting which is different from prior episodes.  She presented to her local ED and had a CT scan which was concerning for pancreatitis (could not rule out underlying neoplasm) but lipase was normal so she was discharged home.  There was also note of possible cholelithiasis vs. Sludge.  She followed up with a physician outpatient and they were going to do some additional imaging pending insurance approval but due to ongoing significant pain, she sought attention at our ED today.  Admission was requested for further management.      Personal History     Past Medical History:   Diagnosis Date    History of recurrent miscarriages     X 3             No past surgical history on file.    Family History: family history includes No Known Problems in her father and mother.     Social History:  reports that she has never smoked. She has never used smokeless tobacco. She reports that she does not drink alcohol and does not use drugs.  Social History     Social History Narrative    Not on file       Medications:  Available home medication information reviewed.  (Not in a hospital admission)      Allergies   Allergen Reactions    Penicillins Hives       Objective   Objective     Vital Signs:   Temp:  [97.4 °F (36.3 °C)] 97.4 °F (36.3 °C)  Heart Rate:  [] 87  Resp:  [20] 20  BP: (117-143)/(71-97) 123/77       Physical Exam    Constitutional: No acute distress, awake, alert, laying in bed  HENT: NCAT, mucous membranes moist  Respiratory: Respiratory effort normal   Cardiovascular: RRR, no murmurs, rubs, or gallops  Gastrointestinal: Positive bowel sounds, soft, upper abdomen tender to palpation, nondistended  Musculoskeletal: No bilateral ankle edema  Psychiatric: Appropriate affect, cooperative  Neurologic: Speech clear and fluent  Skin: No rashes on exposed surfaces    Result Review:  I have personally reviewed the results from the time of this admission to 1/3/2024 12:45 EST and agree with these findings:  [x]  Laboratory list / accordion  []  Microbiology  []  Radiology  []  EKG/Telemetry   []  Cardiology/Vascular   []  Pathology  [x]  Old records  Reviewed radiology report from outside hospital  []  Other:  Most notable findings include:         LAB RESULTS:      Lab 01/03/24  0908   WBC 8.95   HEMOGLOBIN 11.8*   HEMATOCRIT 38.3   PLATELETS 333   NEUTROS ABS 6.61   IMMATURE GRANS (ABS) 0.07*   LYMPHS ABS 1.41   MONOS ABS 0.66   EOS ABS 0.12   MCV 75.2*         Lab 01/03/24  0908   SODIUM 136   POTASSIUM 3.7   CHLORIDE 101   CO2 22.0   ANION GAP 13.0   BUN 9   CREATININE 0.78   EGFR 99.2   GLUCOSE 98   CALCIUM 8.7         Lab 01/03/24  0908   TOTAL PROTEIN 6.8   ALBUMIN 3.9   GLOBULIN 2.9   ALT (SGPT) 46*   AST (SGOT) 41*   BILIRUBIN 1.0   ALK PHOS 179*   LIPASE 27                     UA          8/10/2023    14:38 1/3/2024    09:04   Urinalysis   Squamous Epithelial Cells, UA  13-20    Specific Gravity, UA  1.015    Ketones, UA Negative  Negative    Blood, UA  Negative    Leukocytes, UA Trace  Trace    Nitrite, UA  Negative    RBC, UA  11-20    WBC, UA  11-20    Bacteria, UA  1+        Microbiology Results (last 10 days)       ** No results found for the last 240 hours. **            No radiology results from the last 24 hrs        Assessment & Plan   Assessment & Plan     Active Hospital Problems    Diagnosis  POA    **Abdominal  pain [R10.9]  Yes    Pancreatitis [K85.90]  Yes    Morbid obesity [E66.01]  Yes       Acute pancreatitis  -She has symptoms an imaging consistent with pancreatitis so meets criteria despite normal lipase  -Supportive care with IV fluids, IV and PO narcotics as tolerated and antiemetics  -Obtain MRCP given concern for underlying lesion  -Pending results of MRCP she may require GI evaluation vs. General surgery to assess for cholecystectomy as gallstone pancreatitis would seem most likely  -She denies alcohol use  -Triglycerides are 93    Morbid obesity  -BMI 42  -Complicates all aspects of care    Total time spent: 55 minutes  Time spent includes time reviewing chart, face-to-face time, counseling patient/family/caregiver, ordering medications/tests/procedures, communicating with other health care professionals, documenting clinical information in the electronic health record, and coordination of care.     DVT prophylaxis:  SCDs      CODE STATUS:    Code Status and Medical Interventions:   Ordered at: 01/03/24 1245     Code Status (Patient has no pulse and is not breathing):    CPR (Attempt to Resuscitate)     Medical Interventions (Patient has pulse or is breathing):    Full Support       Expected Discharge   Expected Discharge Date: 1/5/2024; Expected Discharge Time:      Diya Fuentes MD  01/03/24

## 2024-01-04 ENCOUNTER — APPOINTMENT (OUTPATIENT)
Dept: CT IMAGING | Facility: HOSPITAL | Age: 39
DRG: 442 | End: 2024-01-04
Payer: COMMERCIAL

## 2024-01-04 ENCOUNTER — APPOINTMENT (OUTPATIENT)
Dept: CARDIOLOGY | Facility: HOSPITAL | Age: 39
DRG: 442 | End: 2024-01-04
Payer: COMMERCIAL

## 2024-01-04 PROBLEM — I81 PORTAL VEIN THROMBOSIS: Status: ACTIVE | Noted: 2024-01-04

## 2024-01-04 PROBLEM — R16.1 SPLENOMEGALY: Status: ACTIVE | Noted: 2024-01-04

## 2024-01-04 LAB
APTT PPP: 37.2 SECONDS (ref 22–39)
BH CV VAS HEPATIC PORTAL SPLEEN LENGTH: 23.44 CM
BH CV VAS HEPATIC PORTAL VEIN DIAMETER: 1.39 CM
BH CV VAS HEPATOPORTAL AORTA AP DIAM: 1.88 CM
BH CV VAS HEPATOPORTAL HEPATIC V LT DIRECTION: NORMAL
BH CV VAS HEPATOPORTAL HEPATIC V MID DIRECTION: NORMAL
BH CV VAS HEPATOPORTAL HEPATIC V RT DIRECTION: NORMAL
BH CV VAS HEPATOPORTAL PORTAL V EXTRAHEPATIC SPONT: NORMAL
BH CV VAS HEPATOPORTAL PORTAL V LT INTRA SPONT: NORMAL
BH CV VAS HEPATOPORTAL PORTAL V MAIN INTRA SPONT: NORMAL
BH CV VAS HEPATOPORTAL PORTAL V RT INTRA SPONT: NORMAL
BH CV VAS SMA AORTA EDV: 41 CM/S
BH CV VAS SMA AORTA PSV: 129 CM/S
BH CV VAS SMA HEPATIC EDV: 79 CM/S
BH CV VAS SMA HEPATIC PSV: 289 CM/S
BH CV VAS SMA SPLENIC EDV: 58 CM/S
BH CV VAS SMA SPLENIC PSV: 150 CM/S
HCYS SERPL-MCNC: 8.3 UMOL/L (ref 0–15)
INR PPP: 1.36 (ref 0.89–1.12)
PROTHROMBIN TIME: 16.9 SECONDS (ref 12.2–14.5)

## 2024-01-04 PROCEDURE — 86146 BETA-2 GLYCOPROTEIN ANTIBODY: CPT | Performed by: HOSPITALIST

## 2024-01-04 PROCEDURE — 85598 HEXAGNAL PHOSPH PLTLT NEUTRL: CPT | Performed by: HOSPITALIST

## 2024-01-04 PROCEDURE — 93975 VASCULAR STUDY: CPT

## 2024-01-04 PROCEDURE — 25010000002 ENOXAPARIN PER 10 MG

## 2024-01-04 PROCEDURE — 86038 ANTINUCLEAR ANTIBODIES: CPT | Performed by: HOSPITALIST

## 2024-01-04 PROCEDURE — 74178 CT ABD&PLV WO CNTR FLWD CNTR: CPT

## 2024-01-04 PROCEDURE — 85303 CLOT INHIBIT PROT C ACTIVITY: CPT | Performed by: HOSPITALIST

## 2024-01-04 PROCEDURE — 85732 THROMBOPLASTIN TIME PARTIAL: CPT | Performed by: HOSPITALIST

## 2024-01-04 PROCEDURE — 85705 THROMBOPLASTIN INHIBITION: CPT | Performed by: HOSPITALIST

## 2024-01-04 PROCEDURE — 85300 ANTITHROMBIN III ACTIVITY: CPT | Performed by: HOSPITALIST

## 2024-01-04 PROCEDURE — 85305 CLOT INHIBIT PROT S TOTAL: CPT | Performed by: HOSPITALIST

## 2024-01-04 PROCEDURE — 85670 THROMBIN TIME PLASMA: CPT | Performed by: HOSPITALIST

## 2024-01-04 PROCEDURE — 85306 CLOT INHIBIT PROT S FREE: CPT | Performed by: HOSPITALIST

## 2024-01-04 PROCEDURE — 83090 ASSAY OF HOMOCYSTEINE: CPT | Performed by: HOSPITALIST

## 2024-01-04 PROCEDURE — 25510000001 IOPAMIDOL PER 1 ML: Performed by: HOSPITALIST

## 2024-01-04 PROCEDURE — 86665 EPSTEIN-BARR CAPSID VCA: CPT | Performed by: PHYSICIAN ASSISTANT

## 2024-01-04 PROCEDURE — 99222 1ST HOSP IP/OBS MODERATE 55: CPT | Performed by: PHYSICIAN ASSISTANT

## 2024-01-04 PROCEDURE — 25810000003 SODIUM CHLORIDE 0.9 % SOLUTION: Performed by: INTERNAL MEDICINE

## 2024-01-04 PROCEDURE — 81241 F5 GENE: CPT | Performed by: HOSPITALIST

## 2024-01-04 PROCEDURE — 85610 PROTHROMBIN TIME: CPT | Performed by: HOSPITALIST

## 2024-01-04 PROCEDURE — 25010000002 ONDANSETRON PER 1 MG: Performed by: INTERNAL MEDICINE

## 2024-01-04 PROCEDURE — 86147 CARDIOLIPIN ANTIBODY EA IG: CPT | Performed by: HOSPITALIST

## 2024-01-04 PROCEDURE — 85302 CLOT INHIBIT PROT C ANTIGEN: CPT | Performed by: HOSPITALIST

## 2024-01-04 PROCEDURE — 81240 F2 GENE: CPT | Performed by: HOSPITALIST

## 2024-01-04 PROCEDURE — 85613 RUSSELL VIPER VENOM DILUTED: CPT | Performed by: HOSPITALIST

## 2024-01-04 PROCEDURE — 99223 1ST HOSP IP/OBS HIGH 75: CPT | Performed by: INTERNAL MEDICINE

## 2024-01-04 PROCEDURE — 85730 THROMBOPLASTIN TIME PARTIAL: CPT | Performed by: HOSPITALIST

## 2024-01-04 PROCEDURE — 25010000002 HYDROMORPHONE PER 4 MG: Performed by: INTERNAL MEDICINE

## 2024-01-04 PROCEDURE — 99233 SBSQ HOSP IP/OBS HIGH 50: CPT | Performed by: HOSPITALIST

## 2024-01-04 RX ORDER — ENOXAPARIN SODIUM 150 MG/ML
1 INJECTION SUBCUTANEOUS EVERY 12 HOURS SCHEDULED
Status: DISCONTINUED | OUTPATIENT
Start: 2024-01-04 | End: 2024-01-05 | Stop reason: HOSPADM

## 2024-01-04 RX ORDER — PROCHLORPERAZINE EDISYLATE 5 MG/ML
5 INJECTION INTRAMUSCULAR; INTRAVENOUS EVERY 6 HOURS PRN
Status: DISCONTINUED | OUTPATIENT
Start: 2024-01-04 | End: 2024-01-05 | Stop reason: HOSPADM

## 2024-01-04 RX ADMIN — ENOXAPARIN SODIUM 120 MG: 120 INJECTION SUBCUTANEOUS at 20:04

## 2024-01-04 RX ADMIN — SODIUM CHLORIDE 100 ML/HR: 9 INJECTION, SOLUTION INTRAVENOUS at 01:08

## 2024-01-04 RX ADMIN — IOPAMIDOL 95 ML: 755 INJECTION, SOLUTION INTRAVENOUS at 18:21

## 2024-01-04 RX ADMIN — HYDROCODONE BITARTRATE AND ACETAMINOPHEN 1 TABLET: 7.5; 325 TABLET ORAL at 10:40

## 2024-01-04 RX ADMIN — ACETAMINOPHEN 650 MG: 325 TABLET ORAL at 20:03

## 2024-01-04 RX ADMIN — HYDROMORPHONE HYDROCHLORIDE 0.5 MG: 1 INJECTION, SOLUTION INTRAMUSCULAR; INTRAVENOUS; SUBCUTANEOUS at 05:41

## 2024-01-04 RX ADMIN — SENNOSIDES AND DOCUSATE SODIUM 2 TABLET: 8.6; 5 TABLET ORAL at 20:04

## 2024-01-04 RX ADMIN — SODIUM CHLORIDE 100 ML/HR: 9 INJECTION, SOLUTION INTRAVENOUS at 10:34

## 2024-01-04 RX ADMIN — ENOXAPARIN SODIUM 120 MG: 120 INJECTION SUBCUTANEOUS at 15:30

## 2024-01-04 RX ADMIN — ONDANSETRON 4 MG: 2 INJECTION INTRAMUSCULAR; INTRAVENOUS at 09:34

## 2024-01-04 RX ADMIN — Medication 10 ML: at 08:30

## 2024-01-04 RX ADMIN — SODIUM CHLORIDE 100 ML/HR: 9 INJECTION, SOLUTION INTRAVENOUS at 18:29

## 2024-01-04 NOTE — PLAN OF CARE
Goal Outcome Evaluation:  Plan of Care Reviewed With: patient        Progress: no change  Outcome Evaluation: pt rested well, MRCP complete this shift, prn pain and nausea meds given with relief, VSS.

## 2024-01-04 NOTE — CONSULTS
"HEMATOLOGY/ONCOLOGY INPATIENT CONSULT    REASON FOR CONSULT: Possible hypercoagulable disorder    Subjective   HISTORY OF PRESENT ILLNESS; I am asked to see this 38 y.o.  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  She has had 6 first trimester miscarriages.  No family history of clotting propensity.  No evidence clinically of pancreatitis.      Past Medical History:   Diagnosis Date    History of recurrent miscarriages     X 3     History reviewed. No pertinent surgical history.    No current facility-administered medications on file prior to encounter.     Current Outpatient Medications on File Prior to Encounter   Medication Sig Dispense Refill    Prenatal Vit-Fe Fumarate-FA (PRENATAL 27-1) 27-1 MG tablet tablet Take 1 tablet by mouth Every Other Day. OTC      Progesterone (Prometrium) 100 MG capsule Take 1 capsule by mouth Daily. 30 capsule 2       Allergies   Allergen Reactions    Penicillins Hives       Social History     Socioeconomic History    Marital status:     Number of children: 1   Tobacco Use    Smoking status: Never    Smokeless tobacco: Never   Vaping Use    Vaping Use: Never used   Substance and Sexual Activity    Alcohol use: No    Drug use: No    Sexual activity: Yes     Partners: Male       Family History   Problem Relation Age of Onset    No Known Problems Father     No Known Problems Mother          REVIEW OF SYSTEMS:  A 14 point review of systems was performed and is negative except as noted above.    Objective   PHYSICAL EXAM:    /84   Pulse 93   Temp 98.2 °F (36.8 °C) (Temporal)   Resp 17   Ht 170.2 cm (67\")   Wt 122 kg (270 lb)   LMP 06/23/2023 (Exact Date)   SpO2 93%   Breastfeeding No   BMI 42.29 kg/m²               ECOG: (1) Restricted in Physically Strenuous Activity, Ambulatory & Able " to Do Work of Light Nature  General: well appearing female in no acute distress  HEENT: sclerae anicteric, oropharynx clear  Lymphatics: no cervical, supraclavicular, inguinal, or axillary adenopathy  Neck: Supple. No thyromegaly.  Cardiovascular: regular rate and rhythm, no murmurs  Lungs: clear to auscultation bilaterally. No respiratory distress  Abdomen: soft, nontender, nondistended.  No palpable organomegaly  Extremities: no lower extremity edema, cyanosis, or clubbing  Skin: no rashes, lesions, bruising, or petechiae  Neuro: Alert and oriented x3. Moves all extremities.    Results:    Results from last 7 days   Lab Units 01/03/24  0908   WBC 10*3/mm3 8.95   HEMOGLOBIN g/dL 11.8*   PLATELETS 10*3/mm3 333     Results from last 7 days   Lab Units 01/03/24  0908   SODIUM mmol/L 136   POTASSIUM mmol/L 3.7   CO2 mmol/L 22.0   BUN mg/dL 9   CREATININE mg/dL 0.78   GLUCOSE mg/dL 98     Results from last 7 days   Lab Units 01/03/24  0908   AST (SGOT) U/L 41*   ALT (SGPT) U/L 46*   BILIRUBIN mg/dL 1.0   ALK PHOS U/L 179*         Duplex Portal Hepatic Complete CAR    Result Date: 1/4/2024  Impression: 1. Portal vein thrombosis. The main portal vein appears to be relatively acutely thrombosed. The intrahepatic right and left portal vein branches appear to be more chronically occluded. 2. Massive splenomegaly Electronically Signed: Marvin Larose MD  1/4/2024 11:46 AM EST  Workstation ID: [id number]    MRI abdomen wo contrast mrcp    Result Date: 1/4/2024  Impression: 1.Cholelithiasis without evidence of acute cholecystitis. 2.Normal MR appearance of the biliary tree and pancreatic duct. 3.Marked splenomegaly. A wedge-shaped area of altered signal in the upper anterior portion of the spleen. This is indeterminate without IV contrast. This could represent a splenic infarct. 4.Abnormal flow void in the portal vein and superior mesenteric vein. This could be related to slow flow from portal hypertension or thrombosis.  Evaluation is limited without IV contrast. The spleen and portal vein could be further evaluated with multiphase contrast-enhanced CT. 5.Mild hepatomegaly. Electronically Signed: Carmelo Sanchez MD  1/4/2024 2:03 AM EST  Workstation ID: [id number]     Assessment    ASSESSMENT & PLAN:    1.  Portal vein thrombosis with massive splenomegaly and mild elevation of liver enzymes with recurrent miscarriages:    I strongly suspect antiphospholipid antibody syndrome.  Treatment of choice long-term in that case would be Coumadin not a DOAC.  In the near term, I would suggest putting her on Lovenox 1 mg/kg subcu every 12 hours.  Provided that she has enough supply to last her for the next couple of weeks and that this is procure before her discharge, I would be Okay with her going home on that and her hypercoagulable workup has been sent as reviewed with her hospitalist earlier today.  I will plan on seeing her back in the next couple of weeks and hopefully by that point we will have the results of her hypercoagulable workup to make the decision of what drug to transition to from her Lovenox as to Coumadin versus Eliquis.    Total time of care today discussing all this with her providers as well as patient in my office orchestrating her care took 1 hour patient care time throughout the day today.      Jed Brown MD    1/4/2024

## 2024-01-04 NOTE — PLAN OF CARE
Goal Outcome Evaluation:           Progress: no change  Outcome Evaluation: VSS on RA. Up ad bentley. Pain managed w/prn norco. Nausea managed w/prn zofran. Tolerated transition to regular diet. Fluids infusing. Adequate UOP. Calm, cooperative, pleasant. Family at bedside for part of shift. Care ongoing, will continue to monitor.

## 2024-01-04 NOTE — PROGRESS NOTES
Pharmacy to Dose Enoxaparin  Ordering provider: Dr. Lizbeth Bailey   Indication: Portal vein thrombosis    Results from last 7 days   Lab Units 01/03/24  0908   SODIUM mmol/L 136   POTASSIUM mmol/L 3.7   CHLORIDE mmol/L 101   CO2 mmol/L 22.0   BUN mg/dL 9   CREATININE mg/dL 0.78   CALCIUM mg/dL 8.7   BILIRUBIN mg/dL 1.0   ALK PHOS U/L 179*   ALT (SGPT) U/L 46*   AST (SGOT) U/L 41*   GLUCOSE mg/dL 98     Estimated Creatinine Clearance: 131.2 mL/min (by C-G formula based on SCr of 0.78 mg/dL).  Documented weights    01/03/24 0845   Weight: 122 kg (270 lb)     Start enoxaparin 120 mg (~1 mg/kg) subcutaneously every 12 hours.     Thank you,    Solange Copeland, PharmD, BCPS  1/4/2024  13:59 EST

## 2024-01-04 NOTE — CONSULTS
[facility]    Referring Provider: Lizbeth Bailey MD     PCP: Provider, No Known    Reason for Consultation: Abdominal pain     Chief complaint: Abdominal pain     History of present illness:    Esperanza Waldron is a very pleasant 38 y.o. female who is admitted with upper abdominal pain.   She describes recurrent upper abdominal pain for six months.   Symptoms began shortly after her last miscarriage in August 2023.   She states she unfortunately has had 6 miscarriages.   The pain is sharp and stabbing in the upper abdomen and often causes her to bend forward to get relief.   She has noticed that the pain tends to occur near the onset of her menstrual cycles.       She presented to her local ER in [place] for this pain on 12/30 and underwent a CT scan that reportedly showed a gallstone or sludge and possible pancreatic edema.  Lipase was however normal.   She then followed up outpatient with a general surgeon, Dr. Pandya whom ordered a contrasted pancreas protocol CT.    She was awaiting insurance approval for this when her symptoms worsened yesterday thus prompting her to present to our facility.       Allergies:  Penicillins    Scheduled Meds:  senna-docusate sodium, 2 tablet, Oral, BID  sodium chloride, 10 mL, Intravenous, Q12H         Infusions:  Pharmacy to Dose enoxaparin (LOVENOX),   sodium chloride, 100 mL/hr, Last Rate: 100 mL/hr (01/04/24 1034)        PRN Meds:    acetaminophen **OR** acetaminophen **OR** acetaminophen    senna-docusate sodium **AND** polyethylene glycol **AND** bisacodyl **AND** bisacodyl    Calcium Replacement - Follow Nurse / BPA Driven Protocol    HYDROcodone-acetaminophen    HYDROmorphone **AND** naloxone    Magnesium Standard Dose Replacement - Follow Nurse / BPA Driven Protocol    ondansetron    Pharmacy to Dose enoxaparin (LOVENOX)    Phosphorus Replacement - Follow Nurse / BPA Driven Protocol    Potassium Replacement - Follow Nurse / BPA Driven Protocol     "prochlorperazine    Sodium Chloride (PF)    sodium chloride    sodium chloride    Home Meds:  Medications Prior to Admission   Medication Sig Dispense Refill Last Dose    Prenatal Vit-Fe Fumarate-FA (PRENATAL 27-1) 27-1 MG tablet tablet Take 1 tablet by mouth Every Other Day. OTC       Progesterone (Prometrium) 100 MG capsule Take 1 capsule by mouth Daily. 30 capsule 2        ROS: Review of Systems   Constitutional:  Positive for chills.   HENT: Negative.     Eyes: Negative.    Respiratory: Negative.     Cardiovascular: Negative.    Gastrointestinal:  Positive for abdominal pain.   Endocrine: Negative.    Genitourinary: Negative.    Musculoskeletal: Negative.    Skin: Negative.    Neurological: Negative.    Hematological: Negative.    Psychiatric/Behavioral: Negative.         PAST MED HX:  Past Medical History:   Diagnosis Date    History of recurrent miscarriages     X 3       PAST SURG HX:  History reviewed. No pertinent surgical history.    FAM HX:  Family History   Problem Relation Age of Onset    No Known Problems Father     No Known Problems Mother        SOC HX:  Social History     Socioeconomic History    Marital status:     Number of children: 1   Tobacco Use    Smoking status: Never    Smokeless tobacco: Never   Vaping Use    Vaping Use: Never used   Substance and Sexual Activity    Alcohol use: No    Drug use: No    Sexual activity: Yes     Partners: Male       PHYSICAL EXAM  /82   Pulse 100   Temp 98 °F (36.7 °C) (Temporal)   Resp 18   Ht 170.2 cm (67\")   Wt 122 kg (270 lb)   LMP 06/23/2023 (Exact Date)   SpO2 91%   Breastfeeding No   BMI 42.29 kg/m²   Wt Readings from Last 3 Encounters:   01/03/24 122 kg (270 lb)   08/10/23 120 kg (263 lb 12.8 oz)   06/07/22 119 kg (262 lb 9.6 oz)   ,body mass index is 42.29 kg/m².  Physical Exam  Constitutional:       General: She is not in acute distress.     Appearance: She is obese. She is not ill-appearing.   Cardiovascular:      Rate and " Rhythm: Normal rate and regular rhythm.   Pulmonary:      Effort: Pulmonary effort is normal. No respiratory distress.   Abdominal:      General: Bowel sounds are normal.      Palpations: Abdomen is soft.      Tenderness: There is abdominal tenderness in the epigastric area. There is no guarding or rebound.   Neurological:      Mental Status: She is alert and oriented to person, place, and time.   Psychiatric:         Behavior: Behavior normal.         Thought Content: Thought content normal.         Results Review:   I reviewed the patient's new clinical results.    Lab Results   Component Value Date    WBC 8.95 01/03/2024    HGB 11.8 (L) 01/03/2024    HGB 15.6 06/01/2022    HGB 10.2 (L) 08/21/2017    HCT 38.3 01/03/2024    MCV 75.2 (L) 01/03/2024     01/03/2024     Lab Results   Component Value Date    INR 1.36 (H) 01/04/2024     Lab Results   Component Value Date    GLUCOSE 98 01/03/2024    BUN 9 01/03/2024    CREATININE 0.78 01/03/2024    BCR 11.5 01/03/2024     01/03/2024    K 3.7 01/03/2024    CO2 22.0 01/03/2024    CALCIUM 8.7 01/03/2024    ALBUMIN 3.9 01/03/2024    ALKPHOS 179 (H) 01/03/2024    BILITOT 1.0 01/03/2024    ALT 46 (H) 01/03/2024    AST 41 (H) 01/03/2024     MRI abdomen without contrast mrcp: (as interpreted by radiologist)  Findings:  The liver appears normal signal. There is mild hepatomegaly with the liver measuring up to 20 cm craniocaudal at the midclavicular line. There is a 5 mm cyst in the central liver. There is mild periportal edema in the liver. There is an abnormal flow   void in the portal vein and the superior mesenteric vein. Evaluation is limited without IV contrast, but this does raise concern for thrombosis. Slow flow from portal hypertension could have this appearance, as well.   There is a solitary gallstone measuring 20 mm diameter. The gallbladder is nondistended and there is no gallbladder wall thickening. The biliary tree is nondistended. The common bile duct  measures up to 2-3 mm diameter. No evidence of choledocholithiasis or biliary stricture or mass. The pancreas appears homogeneous although evaluation is somewhat limited without IV contrast. There is no pancreatic duct distention and the pancreatic duct appears smooth in character.   The spleen is markedly enlarged measuring up to 24 x 21 x 10 cm. The spleen is mildly heterogeneous. There is a wedge-shaped area of altered signal within the upper anterior portion of the spleen that appears hypointense on T2 imaging. This area measures approximately 6 cm diameter. There is a small amount of perisplenic fluid and there is a small amount of fluid at the pancreatic tail, medial spleen and extending into the left anterior pararenal space and paracolic gutter.   The kidneys appear normal. No hydronephrosis. There is no evidence of adenopathy. Lung bases are clear.   IMPRESSION:  Impression:  1.Cholelithiasis without evidence of acute cholecystitis.  2.Normal MR appearance of the biliary tree and pancreatic duct.  3.Marked splenomegaly. A wedge-shaped area of altered signal in the upper anterior portion of the spleen. This is indeterminate without IV contrast. This could represent a splenic infarct.  4.Abnormal flow void in the portal vein and superior mesenteric vein. This could be related to slow flow from portal hypertension or thrombosis. Evaluation is limited without IV contrast. The spleen and portal vein could be further evaluated with   multiphase contrast-enhanced CT.  5.Mild hepatomegaly.    Portal duplex:   Findings:  There is poor visualization of the splenic and superior mesenteric vein secondary to overlying bowel gas. There is absence of flow within the main portal vein, which is near anechoic suggests of acute thrombosis. No flow is noted within the right and   left portal veins, but this is more echogenic in nature and may be more chronic. The portal vein measures 1.4 cm in diameter.   Marked splenomegaly  is identified. The spleen measures 7.6 x 23.3 x 23.4 cm. No significant ascites is identified. There is hepatopetal fugal flow within the hepatic veins.   IMPRESSION:  Impression:   1. Portal vein thrombosis. The main portal vein appears to be relatively acutely thrombosed. The intrahepatic right and left portal vein branches appear to be more chronically occluded.  2. Massive splenomegaly    ASSESSMENTS/PLANS    Portal vein thrombosis   Splenomegaly, possible splenic infarct on noncontrast MRI   Mildly elevated LFTS   Obesity, BMI is 42  Recurrent miscarriages     MRCP personally reviewed.   Patient has a solitary cholelithiasis without cholecystitis nor choledocholithiasis.  Common bile duct is 2-3 mm.   There is significant splenomegaly.      Subsequent portal duplex shows portal vein thrombosis with the main portal vein appearing relatively acutely thrombosed.   Intrahepatic right and left portal vein branches appear to be more chronically occluded.    >> Recommend contrasted 3 phase CT abdomen   >> Hematology consultation for work up of possible prothrombotic disorder.   Patient will need anticoagulation for acute PVT.   She has been started on Lovenox     I discussed the patient's findings and my recommendations with patient    YAS Meehan  01/04/24  13:02 EST

## 2024-01-04 NOTE — PROGRESS NOTES
[facility] Medicine Services  PROGRESS NOTE    Patient Name: Esperanza Waldron  : 1985  MRN: [id number]    Date of Admission: 1/3/2024  Primary Care Physician: Provider, No Known    Subjective   Subjective     CC:  F/U abdominal pain    HPI:  Seen this morning. Pain is improved. Just completed portal duplex at bedside. She reports she has been having more pain when laying on her left side.       Objective   Objective     Vital Signs:   Temp:  [97.8 °F (36.6 °C)-99.8 °F (37.7 °C)] 98 °F (36.7 °C)  Heart Rate:  [] 100  Resp:  [16-18] 18  BP: (110-135)/(70-91) 127/82     Physical Exam:  Gen-no acute distress  HENT-NCAT, mucous membranes moist  CV-RRR, S1 S2 normal, no m/r/g  Resp-CTAB, no wheezes or rales  Abd-soft, TTP upper abdomen, ND, +BS  Ext-no edema  Neuro-A&Ox3, no focal deficits  Skin-no rashes  Psych-appropriate mood      Results Reviewed:  LAB RESULTS:      Lab 24  0949 24  0908   WBC  --  8.95   HEMOGLOBIN  --  11.8*   HEMATOCRIT  --  38.3   PLATELETS  --  333   NEUTROS ABS  --  6.61   IMMATURE GRANS (ABS)  --  0.07*   LYMPHS ABS  --  1.41   MONOS ABS  --  0.66   EOS ABS  --  0.12   MCV  --  75.2*   PROTIME 16.9*  --    APTT 37.2  --          Lab 24  0908   SODIUM 136   POTASSIUM 3.7   CHLORIDE 101   CO2 22.0   ANION GAP 13.0   BUN 9   CREATININE 0.78   EGFR 99.2   GLUCOSE 98   CALCIUM 8.7         Lab 24  0908   TOTAL PROTEIN 6.8   ALBUMIN 3.9   GLOBULIN 2.9   ALT (SGPT) 46*   AST (SGOT) 41*   BILIRUBIN 1.0   ALK PHOS 179*   LIPASE 27         Lab 24  0949   PROTIME 16.9*   INR 1.36*         Lab 24  0908   TRIGLYCERIDES 93             Brief Urine Lab Results  (Last result in the past 365 days)        Color   Clarity   Blood   Leuk Est   Nitrite   Protein   CREAT   Urine HCG        24 0917               Negative               Microbiology Results Abnormal       None            Duplex Portal Hepatic Complete CAR    Result Date:  1/4/2024  DUPLEX PORTAL HEPATIC COMPLETE CAR Date of Exam: 1/4/2024 9:50 AM EST Indication: portal vein thrombosis abdominal pain portal hypertension. Comparison: No comparisons available. Technique: Grayscale, color-flow, and spectral imaging of the hepatic and portal vasculature was obtained. Findings: There is poor visualization of the splenic and superior mesenteric vein secondary to overlying bowel gas. There is absence of flow within the main portal vein, which is near anechoic suggests of acute thrombosis. No flow is noted within the right and left portal veins, but this is more echogenic in nature and may be more chronic. The portal vein measures 1.4 cm in diameter. Marked splenomegaly is identified. The spleen measures 7.6 x 23.3 x 23.4 cm. No significant ascites is identified. There is hepatopetal fugal flow within the hepatic veins.     Impression: Impression: 1. Portal vein thrombosis. The main portal vein appears to be relatively acutely thrombosed. The intrahepatic right and left portal vein branches appear to be more chronically occluded. 2. Massive splenomegaly Electronically Signed: Marvin Larose MD  1/4/2024 11:46 AM EST  Workstation ID: [id number]    MRI abdomen wo contrast mrcp    Result Date: 1/4/2024  MRI ABDOMEN WO CONTRAST MRCP Date of Exam: 1/4/2024 12:01 AM EST Indication: Pancreatitis suspected.  Comparison: None available. Technique:  Routine multiplanar/multisequence images of the abdomen were obtained with MRCP sequences without contrast administration. Findings: The liver appears normal signal. There is mild hepatomegaly with the liver measuring up to 20 cm craniocaudal at the midclavicular line. There is a 5 mm cyst in the central liver. There is mild periportal edema in the liver. There is an abnormal flow void in the portal vein and the superior mesenteric vein. Evaluation is limited without IV contrast, but this does raise concern for thrombosis. Slow flow from portal hypertension  could have this appearance, as well. There is a solitary gallstone measuring 20 mm diameter. The gallbladder is nondistended and there is no gallbladder wall thickening. The biliary tree is nondistended. The common bile duct measures up to 2-3 mm diameter. No evidence of choledocholithiasis  or biliary stricture or mass. The pancreas appears homogeneous although evaluation is somewhat limited without IV contrast. There is no pancreatic duct distention and the pancreatic duct appears smooth in character. The spleen is markedly enlarged measuring up to 24 x 21 x 10 cm. The spleen is mildly heterogeneous. There is a wedge-shaped area of altered signal within the upper anterior portion of the spleen that appears hypointense on T2 imaging. This area measures  approximately 6 cm diameter. There is a small amount of perisplenic fluid and there is a small amount of fluid at the pancreatic tail, medial spleen and extending into the left anterior pararenal space and paracolic gutter. The kidneys appear normal. No hydronephrosis. There is no evidence of adenopathy. Lung bases are clear.     Impression: Impression: 1.Cholelithiasis without evidence of acute cholecystitis. 2.Normal MR appearance of the biliary tree and pancreatic duct. 3.Marked splenomegaly. A wedge-shaped area of altered signal in the upper anterior portion of the spleen. This is indeterminate without IV contrast. This could represent a splenic infarct. 4.Abnormal flow void in the portal vein and superior mesenteric vein. This could be related to slow flow from portal hypertension or thrombosis. Evaluation is limited without IV contrast. The spleen and portal vein could be further evaluated with multiphase contrast-enhanced CT. 5.Mild hepatomegaly. Electronically Signed: Carmelo Sanchez MD  1/4/2024 2:03 AM EST  Workstation ID: [id number]         Current medications:  Scheduled Meds:senna-docusate sodium, 2 tablet, Oral, BID  sodium chloride, 10 mL, Intravenous,  Q12H      Continuous Infusions:sodium chloride, 100 mL/hr, Last Rate: 100 mL/hr (01/04/24 1034)      PRN Meds:.  acetaminophen **OR** acetaminophen **OR** acetaminophen    senna-docusate sodium **AND** polyethylene glycol **AND** bisacodyl **AND** bisacodyl    Calcium Replacement - Follow Nurse / BPA Driven Protocol    HYDROcodone-acetaminophen    HYDROmorphone **AND** naloxone    Magnesium Standard Dose Replacement - Follow Nurse / BPA Driven Protocol    ondansetron    Phosphorus Replacement - Follow Nurse / BPA Driven Protocol    Potassium Replacement - Follow Nurse / BPA Driven Protocol    prochlorperazine    Sodium Chloride (PF)    sodium chloride    sodium chloride    Assessment & Plan   Assessment & Plan     Active Hospital Problems    Diagnosis  POA    **Abdominal pain [R10.9]  Yes    Portal vein thrombosis [I81]  Yes    Splenomegaly [R16.1]  Yes    Morbid obesity [E66.01]  Yes      Resolved Hospital Problems   No resolved problems to display.        Brief Hospital Course to date:  Esperanza Waldron is a 38 y.o. female with history of recurrent miscarriages, obesity, and several month history of intermittent abdominal pain who presented to the ED with abdominal pain that began a few days ago.    Abdominal pain  Portal vein thrombosis  Possible splenic infarct  --CT A/P at local ER on 12/30/23 reportedly showed concern for pancreatitis, could not rule out underlying neoplasm, however lipase was normal so she was discharged home  --Labs here with mildly elevated transaminases, normal bilirubin, normal lipase  --MRCP ordered here: cholelithiasis without cholecystitis, pancreas appeared homogenous, normal pancreatic duct, normal appearance of biliary tree, abnormal flow void in portal vein and superior mesenteric vein (possible thrombosis), marked splenomegaly and concern for upper anterior wedge shaped area (possible splenic infarct)  --Portal duplex ordered and reviewed: portal vein thrombosis confirmed  --With  hx of recurrent miscarriages and MRCP findings, concern for hypercoagulable disorder; have ordered hypercoag labs  --Will consult Hem/Onc as well as GI for further workup and evaluation  --Will plan to start anticoagulation based on Hem/Onc recs  --Supportive care with pain/nausea meds    Total time spent: Time Spent: Time Spent: 55 minutes  Time spent includes time reviewing chart, face-to-face time, counseling patient/family/caregiver, ordering medications/tests/procedures, communicating with other health care professionals, documenting clinical information in the electronic health record, and coordination of care.         Expected Discharge Location and Transportation: home  Expected Discharge   Expected Discharge Date: 1/5/2024; Expected Discharge Time:      DVT prophylaxis:  Mechanical DVT prophylaxis orders are present.     AM-PAC 6 Clicks Score (PT): 24 (01/04/24 0800)    CODE STATUS:   Code Status and Medical Interventions:   Ordered at: 01/03/24 1245     Code Status (Patient has no pulse and is not breathing):    CPR (Attempt to Resuscitate)     Medical Interventions (Patient has pulse or is breathing):    Full Support       Lizbeth Bailey MD  01/04/24

## 2024-01-05 VITALS
RESPIRATION RATE: 18 BRPM | SYSTOLIC BLOOD PRESSURE: 127 MMHG | BODY MASS INDEX: 42.38 KG/M2 | TEMPERATURE: 97.6 F | OXYGEN SATURATION: 96 % | WEIGHT: 270 LBS | DIASTOLIC BLOOD PRESSURE: 77 MMHG | HEART RATE: 83 BPM | HEIGHT: 67 IN

## 2024-01-05 PROBLEM — R10.9 ABDOMINAL PAIN: Status: RESOLVED | Noted: 2024-01-03 | Resolved: 2024-01-05

## 2024-01-05 LAB
ALBUMIN SERPL-MCNC: 3.9 G/DL (ref 3.5–5.2)
ALBUMIN/GLOB SERPL: 1.4 G/DL
ALP SERPL-CCNC: 194 U/L (ref 39–117)
ALT SERPL W P-5'-P-CCNC: 30 U/L (ref 1–33)
ANA SER QL: NEGATIVE
ANION GAP SERPL CALCULATED.3IONS-SCNC: 13 MMOL/L (ref 5–15)
AST SERPL-CCNC: 21 U/L (ref 1–32)
AT III ACT/NOR PPP CHRO: 114 % (ref 75–135)
BILIRUB SERPL-MCNC: 0.8 MG/DL (ref 0–1.2)
BUN SERPL-MCNC: 6 MG/DL (ref 6–20)
BUN/CREAT SERPL: 7.9 (ref 7–25)
CALCIUM SPEC-SCNC: 8.7 MG/DL (ref 8.6–10.5)
CARDIOLIPIN IGG SER IA-ACNC: <9 GPL U/ML (ref 0–14)
CARDIOLIPIN IGM SER IA-ACNC: 9 MPL U/ML (ref 0–12)
CHLORIDE SERPL-SCNC: 103 MMOL/L (ref 98–107)
CO2 SERPL-SCNC: 24 MMOL/L (ref 22–29)
CREAT SERPL-MCNC: 0.76 MG/DL (ref 0.57–1)
DEPRECATED RDW RBC AUTO: 55.4 FL (ref 37–54)
EBV VCA IGM SER IA-ACNC: <36 U/ML (ref 0–35.9)
EGFRCR SERPLBLD CKD-EPI 2021: 102.4 ML/MIN/1.73
ERYTHROCYTE [DISTWIDTH] IN BLOOD BY AUTOMATED COUNT: 20.9 % (ref 12.3–15.4)
F5 GENE MUT ANL BLD/T: NORMAL
FACTOR II, DNA ANALYSIS: NORMAL
GLOBULIN UR ELPH-MCNC: 2.7 GM/DL
GLUCOSE SERPL-MCNC: 100 MG/DL (ref 65–99)
HCT VFR BLD AUTO: 38.9 % (ref 34–46.6)
HGB BLD-MCNC: 11.7 G/DL (ref 12–15.9)
MCH RBC QN AUTO: 23.1 PG (ref 26.6–33)
MCHC RBC AUTO-ENTMCNC: 30.1 G/DL (ref 31.5–35.7)
MCV RBC AUTO: 76.7 FL (ref 79–97)
PLATELET # BLD AUTO: 376 10*3/MM3 (ref 140–450)
PMV BLD AUTO: 12.3 FL (ref 6–12)
POTASSIUM SERPL-SCNC: 3.8 MMOL/L (ref 3.5–5.2)
PROT SERPL-MCNC: 6.6 G/DL (ref 6–8.5)
RBC # BLD AUTO: 5.07 10*6/MM3 (ref 3.77–5.28)
SODIUM SERPL-SCNC: 140 MMOL/L (ref 136–145)
WBC NRBC COR # BLD AUTO: 7.93 10*3/MM3 (ref 3.4–10.8)

## 2024-01-05 PROCEDURE — 99231 SBSQ HOSP IP/OBS SF/LOW 25: CPT | Performed by: INTERNAL MEDICINE

## 2024-01-05 PROCEDURE — 80053 COMPREHEN METABOLIC PANEL: CPT | Performed by: HOSPITALIST

## 2024-01-05 PROCEDURE — 85027 COMPLETE CBC AUTOMATED: CPT | Performed by: HOSPITALIST

## 2024-01-05 PROCEDURE — 99238 HOSP IP/OBS DSCHRG MGMT 30/<: CPT | Performed by: HOSPITALIST

## 2024-01-05 PROCEDURE — 25010000002 ENOXAPARIN PER 10 MG

## 2024-01-05 RX ORDER — ENOXAPARIN SODIUM 150 MG/ML
1 INJECTION SUBCUTANEOUS EVERY 12 HOURS SCHEDULED
Qty: 22.4 ML | Refills: 0 | Status: SHIPPED | OUTPATIENT
Start: 2024-01-05 | End: 2024-01-19

## 2024-01-05 RX ORDER — PROGESTERONE 100 MG/1
100 CAPSULE ORAL DAILY
Start: 2024-01-05

## 2024-01-05 RX ORDER — ENOXAPARIN SODIUM 150 MG/ML
120 INJECTION SUBCUTANEOUS EVERY 12 HOURS SCHEDULED
Qty: 6.4 ML | Refills: 0 | Status: SHIPPED | OUTPATIENT
Start: 2024-01-05 | End: 2024-01-09

## 2024-01-05 RX ORDER — ACETAMINOPHEN 325 MG/1
650 TABLET ORAL EVERY 4 HOURS PRN
Start: 2024-01-05

## 2024-01-05 RX ADMIN — ENOXAPARIN SODIUM 120 MG: 120 INJECTION SUBCUTANEOUS at 09:56

## 2024-01-05 RX ADMIN — ACETAMINOPHEN 650 MG: 325 TABLET ORAL at 02:48

## 2024-01-05 RX ADMIN — Medication 10 ML: at 09:57

## 2024-01-05 NOTE — DISCHARGE INSTRUCTIONS
HOLD your progesterone/Prometrium until re-evaluated by OB/GYN as hormonal therapies can increase risk of blood clots.

## 2024-01-05 NOTE — CASE MANAGEMENT/SOCIAL WORK
Case Management Discharge Note      Final Note: 'er met with patient and  at bedside. No discharge planning needs. Plan Home with  transporting.         Selected Continued Care - Admitted Since 1/3/2024       Destination    No services have been selected for the patient.                Durable Medical Equipment    No services have been selected for the patient.                Dialysis/Infusion    No services have been selected for the patient.                Home Medical Care    No services have been selected for the patient.                Therapy    No services have been selected for the patient.                Community Resources    No services have been selected for the patient.                Community & DME    No services have been selected for the patient.                         Final Discharge Disposition Code: 01 - home or self-care

## 2024-01-05 NOTE — PROGRESS NOTES
HEMATOLOGY/ONCOLOGY PROGRESS NOTE    Subjective      CC: No new somatic complaints    SUBJECTIVE: No new somatic complaints        Past Medical History, Past Surgical History, Social History, Family History have been reviewed and are without significant changes except as mentioned.      Medications:  The current medication list was reviewed in the EMR    ALLERGIES:   Allergies   Allergen Reactions    Penicillins Hives       ROS:  No new somatic complaint      Objective      Vitals:    01/04/24 2300 01/05/24 0249 01/05/24 0300 01/05/24 0700   BP: 122/69  126/84 127/77   BP Location: Right arm  Right arm Right arm   Patient Position: Lying  Lying Lying   Pulse: 88 95 78 83   Resp: 18  18 18   Temp: 98 °F (36.7 °C)  98.1 °F (36.7 °C) 97.6 °F (36.4 °C)   TempSrc: Tympanic  Tympanic Oral   SpO2:  94% 94% 96%   Weight:       Height:                       ECOG: (0) Fully Active - Able to Carry On All Pre-disease Performance Without Restriction    General: well appearing, in no acute distress  HEENT: sclerae anicteric, oropharynx clear  Lymphatics: no cervical, supraclavicular, inguinal, or axillary adenopathy  Cardiovascular: regular rate and rhythm, no murmurs  Lungs: clear to auscultation bilaterally  Abdomen: soft, nontender, nondistended.  No palpable organomegaly  Extremities: no lower extremity edema  Skin: no rashes, lesions, bruising, or petechiae  Neuro: Alert and oriented x 3. Moves all extremities.    RECENT LABS:    Results from last 7 days   Lab Units 01/05/24  0359 01/03/24  0908   WBC 10*3/mm3 7.93 8.95   HEMOGLOBIN g/dL 11.7* 11.8*   PLATELETS 10*3/mm3 376 333     Results from last 7 days   Lab Units 01/05/24  0359 01/03/24  0908   SODIUM mmol/L 140 136   POTASSIUM mmol/L 3.8 3.7   CO2 mmol/L 24.0 22.0   BUN mg/dL 6 9   CREATININE mg/dL 0.76 0.78   GLUCOSE mg/dL 100* 98     Results from last 7 days   Lab Units 01/05/24  0359 01/03/24  0908   AST (SGOT) U/L 21 41*   ALT (SGPT) U/L 30 46*   BILIRUBIN mg/dL  0.8 1.0   ALK PHOS U/L 194* 179*         CT Abdomen Pelvis With & Without Contrast    Result Date: 1/4/2024  Impression: 1. 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 Edema of the small bowel mesentery. 2. Mildly enlarged and lobular appearing pancreatic head. No well-defined discrete mass can be demonstrated and there is no evidence of pancreatic ductal dilatation to infer the presence of neoplasm. 3. Marked splenomegaly, and suspected small left upper pole splenic infarct. 4. Noncalcified cholelithiasis. Electronically Signed: Harlan Manzanares MD  1/4/2024 7:03 PM EST  Workstation ID: [id number]    Duplex Portal Hepatic Complete CAR    Result Date: 1/4/2024  Impression: 1. Portal vein thrombosis. The main portal vein appears to be relatively acutely thrombosed. The intrahepatic right and left portal vein branches appear to be more chronically occluded. 2. Massive splenomegaly Electronically Signed: Marvin Larose MD  1/4/2024 11:46 AM EST  Workstation ID: [id number]    MRI abdomen wo contrast mrcp    Result Date: 1/4/2024  Impression: 1.Cholelithiasis without evidence of acute cholecystitis. 2.Normal MR appearance of the biliary tree and pancreatic duct. 3.Marked splenomegaly. A wedge-shaped area of altered signal in the upper anterior portion of the spleen. This is indeterminate without IV contrast. This could represent a splenic infarct. 4.Abnormal flow void in the portal vein and superior mesenteric vein. This could be related to slow flow from portal hypertension or thrombosis. Evaluation is limited without IV contrast. The spleen and portal vein could be further evaluated with multiphase contrast-enhanced CT. 5.Mild hepatomegaly. Electronically Signed: Carmelo Sanchez MD  1/4/2024 2:03 AM EST  Workstation ID: [id number]             Assessment   ASSESSMENT & PLAN:  1.  Portal vein thrombosis with splenomegaly with small left upper pole  splenic infarct and mild liver enzyme elevation with recurrent miscarriages:  Protein C antigen and activity, protein S antigen total and free and functional, beta-2 glycoprotein, anticardiolipin, lupus anticoagulant and Antithrombin III all pending.Prothrombin gene mutation and factor V Leiden mutation negative.  ROLA negative.  Has a 2-week supply of Lovenox and we should have results on these tests by the 12th when I see her back in my office to discern as to transitioning to Coumadin versus switching to Eliquis.  Nothing on MRI abdomen MRCP or CT abdomen pelvis With and without contrast to suggest a pancreatic neoplasm.  The pancreatic head was mildly enlarged and lobular but no discrete mass.  Hemoglobin 11.7 with MCV 76.7 and I will repeat her CBC and get iron indices when I see her back but at 39 odds of malignancy related cause of anemia low but we can consider Cologuard when I see her back.  Alkaline phosphatase 194 today with ALT 30 AST 21 now normal with normal bilirubin 0.8 and glucose 100.  Patient ready for discharge.  Total time of care today 15 minutes                    Jed Brown MD    1/5/2024

## 2024-01-05 NOTE — PLAN OF CARE
Goal Outcome Evaluation:  Plan of Care Reviewed With: patient, spouse        Progress: improving  Outcome Evaluation: VSS on RA. Discharge information packet provided. Education regarding medication, medication side effects, follow-up appointments, medication changes, no activity restrictions. Alcohol swabs sent home with patient & family to facilitate medication administration. Teach back & demonstration used to educate regarding home lovenox administration. Up ad bentley. Showered.

## 2024-01-05 NOTE — PLAN OF CARE
VSS. Low grade temp- tylenol, I/S use and ambulation encouraged.  BM x 1  Tolerating diet denies nausea.  Mild pain PRN tylenol given.  SCUDS in place.

## 2024-01-05 NOTE — DISCHARGE SUMMARY
[facility] Medicine Services  DISCHARGE SUMMARY    Patient Name: Esperanza Waldron  : 1985  MRN: [id number]    Date of Admission: 1/3/2024  8:47 AM  Date of Discharge:  24  Primary Care Physician: Provider, No Known    Consults       Date and Time Order Name Status Description    2024  7:50 AM Inpatient Hematology & Oncology Consult Completed     2024  7:50 AM Inpatient Gastroenterology Consult Completed             Hospital Course     Presenting Problem: abdominal pain    Active Hospital Problems    Diagnosis  POA    Portal vein thrombosis [I81]  Yes    Splenomegaly [R16.1]  Yes    Morbid obesity [E66.01]  Yes      Resolved Hospital Problems    Diagnosis Date Resolved POA    **Abdominal pain [R10.9] 2024 Yes          Hospital Course:  Esperanza Waldron is a 38 y.o. female with history of recurrent miscarriages, obesity, and several month history of intermittent abdominal pain who presented to the ED with abdominal pain that began a few days ago.     Abdominal pain  Portal vein thrombosis  Possible splenic infarct  Suspected hypercoagulable disorder  --CT A/P at local ER on 23 reportedly showed concern for pancreatitis, could not rule out underlying neoplasm, however lipase was normal so she was discharged home  --Labs here with mildly elevated transaminases, normal bilirubin, normal lipase  --MRCP ordered here: cholelithiasis without cholecystitis, pancreas appeared homogenous, normal pancreatic duct, normal appearance of biliary tree, abnormal flow void in portal vein and superior mesenteric vein (possible thrombosis), marked splenomegaly and concern for upper anterior wedge shaped area (possible splenic infarct)  --Portal duplex ordered and reviewed: portal vein thrombosis confirmed  --With hx of recurrent miscarriages and MRCP findings, concern for hypercoagulable disorder; have ordered hypercoag labs - PENDING and will be followed up as outpatient by  Dr. Brown  --Discussed with Dr. Brown, suspicious for antiphospholipid antibody syndrome; recommends discharging on Lovenox with close outpatient follow up -- if diagnosis is confirmed to be antiphospholipid antibody syndrome, then treatment of choice would be Coumadin, not Eliquis/DOAC  --Supportive care with pain/nausea meds  --Recommended patient stop her progesterone (listed on admission med rec) until re-evaluated by OB/GYN and Hematology as outpatient       Discharge Follow Up Recommendations for outpatient labs/diagnostics:  F/U with PCP in 1-2 weeks  F/U with Dr. Brown on 1/12/24 as scheduled    Day of Discharge     HPI:   Seen this morning, abdominal pain improved and controlled with Tylenol.       Vital Signs:   Temp:  [97.6 °F (36.4 °C)-99.3 °F (37.4 °C)] 97.6 °F (36.4 °C)  Heart Rate:  [] 83  Resp:  [17-18] 18  BP: (122-130)/(69-87) 127/77      Physical Exam:  Gen-no acute distress  HENT-NCAT, mucous membranes moist  CV-RRR, S1 S2 normal, no m/r/g  Resp-CTAB, no wheezes or rales  Abd-soft, NT, ND, +BS  Ext-no edema  Neuro-A&Ox3, no focal deficits  Skin-no rashes  Psych-appropriate mood    Pertinent  and/or Most Recent Results     LAB RESULTS:      Lab 01/05/24  0359 01/04/24  0949 01/03/24  0908   WBC 7.93  --  8.95   HEMOGLOBIN 11.7*  --  11.8*   HEMATOCRIT 38.9  --  38.3   PLATELETS 376  --  333   NEUTROS ABS  --   --  6.61   IMMATURE GRANS (ABS)  --   --  0.07*   LYMPHS ABS  --   --  1.41   MONOS ABS  --   --  0.66   EOS ABS  --   --  0.12   MCV 76.7*  --  75.2*   PROTIME  --  16.9*  --    APTT  --  37.2  --          Lab 01/05/24  0359 01/03/24  0908   SODIUM 140 136   POTASSIUM 3.8 3.7   CHLORIDE 103 101   CO2 24.0 22.0   ANION GAP 13.0 13.0   BUN 6 9   CREATININE 0.76 0.78   EGFR 102.4 99.2   GLUCOSE 100* 98   CALCIUM 8.7 8.7         Lab 01/05/24  0359 01/03/24  0908   TOTAL PROTEIN 6.6 6.8   ALBUMIN 3.9 3.9   GLOBULIN 2.7 2.9   ALT (SGPT) 30 46*   AST (SGOT) 21 41*   BILIRUBIN 0.8 1.0   ALK  PHOS 194* 179*   LIPASE  --  27         Lab 01/04/24  0949   PROTIME 16.9*   INR 1.36*         Lab 01/03/24  0908   TRIGLYCERIDES 93             Brief Urine Lab Results  (Last result in the past 365 days)        Color   Clarity   Blood   Leuk Est   Nitrite   Protein   CREAT   Urine HCG        01/03/24 0917               Negative             Microbiology Results (last 10 days)       ** No results found for the last 240 hours. **            CT Abdomen Pelvis With & Without Contrast    Result Date: 1/4/2024  CT ABDOMEN PELVIS W WO CONTRAST Date of Exam: 1/4/2024 6:08 PM EST Indication: PANCREAS MASS PROTOCOL. Comparison: MR abdomen 1/3/2024 Technique: Axial CT images were obtained of the abdomen and pelvis before and after the uneventful intravenous administration of 95 mL Isovue 370. Sagittal and coronal reconstructions were performed.  Automated exposure control and iterative reconstruction methods were used. Findings: MRI report noted cholelithiasis, marked splenomegaly. Questionable splenic infarct. Periportal edema and abnormal flow signal in the portal and superior mesenteric veins concerning for thrombus. The portal veins, superior mesenteric vein, and multiple adjacent branches failed to opacify with contrast on any of the 3 phases of the exam, presumably due to portal and superior mesenteric vein thrombosis. There appears to be extensive but incomplete thrombosis of the splenic vein. There is moderately extensive edema of the small bowel mesentery. Hepatic veins are difficult to visualize. No intrinsic liver lesions are identified. There is persistent wedge-shaped low-density area in the anterior upper pole the spleen, potentially small infarct. A couple of accessory splenules are noted at the splenic hilum. There is evidence of noncalcified cholelithiasis. Adrenal glands appear normal. Kidneys appear within normal limits. There is enlargement of the head of the pancreas approximately 4.5 cm, and lobular  appearance that could reflect an underlying mass, but enhancement is uniform on all phases, and no discrete mass can be delineated. There is likewise no evidence of pancreatic ductal dilatation to suggest obstructing lesion. There are multiple small nodes in the root of the small bowel mesentery. No evidence of invasive mass is seen around the SMA or the celiac axis. Bowel loops are normal in caliber. No bowel wall edema is seen. In the right mid abdomen, the terminal ileum cecum and appendix appear normal. There is trace intrapelvic free fluid considered physiologic. Uterus and ovaries are not enlarged. Delayed venous phase images show no evidence of obstructive uropathy. Bony structures appear to be intact. Included lung bases show mild right and left lower lobe discoid atelectasis and no pleural effusion.     Impression: 1. 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 Edema of the small bowel mesentery. 2. Mildly enlarged and lobular appearing pancreatic head. No well-defined discrete mass can be demonstrated and there is no evidence of pancreatic ductal dilatation to infer the presence of neoplasm. 3. Marked splenomegaly, and suspected small left upper pole splenic infarct. 4. Noncalcified cholelithiasis. Electronically Signed: Harlan Manzanares MD  1/4/2024 7:03 PM EST  Workstation ID: [id number]    Duplex Portal Hepatic Complete CAR    Result Date: 1/4/2024  DUPLEX PORTAL HEPATIC COMPLETE CAR Date of Exam: 1/4/2024 9:50 AM EST Indication: portal vein thrombosis abdominal pain portal hypertension. Comparison: No comparisons available. Technique: Grayscale, color-flow, and spectral imaging of the hepatic and portal vasculature was obtained. Findings: There is poor visualization of the splenic and superior mesenteric vein secondary to overlying bowel gas. There is absence of flow within the main portal vein, which is near anechoic  suggests of acute thrombosis. No flow is noted within the right and left portal veins, but this is more echogenic in nature and may be more chronic. The portal vein measures 1.4 cm in diameter. Marked splenomegaly is identified. The spleen measures 7.6 x 23.3 x 23.4 cm. No significant ascites is identified. There is hepatopetal fugal flow within the hepatic veins.     Impression: 1. Portal vein thrombosis. The main portal vein appears to be relatively acutely thrombosed. The intrahepatic right and left portal vein branches appear to be more chronically occluded. 2. Massive splenomegaly Electronically Signed: Marvin Larose MD  1/4/2024 11:46 AM EST  Workstation ID: [id number]    MRI abdomen wo contrast mrcp    Result Date: 1/4/2024  MRI ABDOMEN WO CONTRAST MRCP Date of Exam: 1/4/2024 12:01 AM EST Indication: Pancreatitis suspected.  Comparison: None available. Technique:  Routine multiplanar/multisequence images of the abdomen were obtained with MRCP sequences without contrast administration. Findings: The liver appears normal signal. There is mild hepatomegaly with the liver measuring up to 20 cm craniocaudal at the midclavicular line. There is a 5 mm cyst in the central liver. There is mild periportal edema in the liver. There is an abnormal flow void in the portal vein and the superior mesenteric vein. Evaluation is limited without IV contrast, but this does raise concern for thrombosis. Slow flow from portal hypertension could have this appearance, as well. There is a solitary gallstone measuring 20 mm diameter. The gallbladder is nondistended and there is no gallbladder wall thickening. The biliary tree is nondistended. The common bile duct measures up to 2-3 mm diameter. No evidence of choledocholithiasis  or biliary stricture or mass. The pancreas appears homogeneous although evaluation is somewhat limited without IV contrast. There is no pancreatic duct distention and the pancreatic duct appears smooth in  character. The spleen is markedly enlarged measuring up to 24 x 21 x 10 cm. The spleen is mildly heterogeneous. There is a wedge-shaped area of altered signal within the upper anterior portion of the spleen that appears hypointense on T2 imaging. This area measures  approximately 6 cm diameter. There is a small amount of perisplenic fluid and there is a small amount of fluid at the pancreatic tail, medial spleen and extending into the left anterior pararenal space and paracolic gutter. The kidneys appear normal. No hydronephrosis. There is no evidence of adenopathy. Lung bases are clear.     Impression: 1.Cholelithiasis without evidence of acute cholecystitis. 2.Normal MR appearance of the biliary tree and pancreatic duct. 3.Marked splenomegaly. A wedge-shaped area of altered signal in the upper anterior portion of the spleen. This is indeterminate without IV contrast. This could represent a splenic infarct. 4.Abnormal flow void in the portal vein and superior mesenteric vein. This could be related to slow flow from portal hypertension or thrombosis. Evaluation is limited without IV contrast. The spleen and portal vein could be further evaluated with multiphase contrast-enhanced CT. 5.Mild hepatomegaly. Electronically Signed: Carmelo Sanchez MD  1/4/2024 2:03 AM EST  Workstation ID: [id number]     Results for orders placed during the hospital encounter of 01/03/24    Duplex Portal Hepatic Complete CAR    Interpretation Summary  DUPLEX PORTAL HEPATIC COMPLETE CAR    Date of Exam: 1/4/2024 9:50 AM EST    Indication: portal vein thrombosis  abdominal pain  portal hypertension.    Comparison: No comparisons available.    Technique: Grayscale, color-flow, and spectral imaging of the hepatic and portal vasculature was obtained.      Findings:  There is poor visualization of the splenic and superior mesenteric vein secondary to overlying bowel gas. There is absence of flow within the main portal vein, which is near anechoic  suggests of acute thrombosis. No flow is noted within the right and  left portal veins, but this is more echogenic in nature and may be more chronic. The portal vein measures 1.4 cm in diameter.    Marked splenomegaly is identified. The spleen measures 7.6 x 23.3 x 23.4 cm. No significant ascites is identified. There is hepatopetal fugal flow within the hepatic veins.    Impression  Impression:    1. Portal vein thrombosis. The main portal vein appears to be relatively acutely thrombosed. The intrahepatic right and left portal vein branches appear to be more chronically occluded.  2. Massive splenomegaly        Electronically Signed: Marvin Larose MD  1/4/2024 11:46 AM EST  Workstation ID: [id number]      Results for orders placed during the hospital encounter of 01/03/24    Duplex Portal Hepatic Complete CAR    Interpretation Summary  DUPLEX PORTAL HEPATIC COMPLETE CAR    Date of Exam: 1/4/2024 9:50 AM EST    Indication: portal vein thrombosis  abdominal pain  portal hypertension.    Comparison: No comparisons available.    Technique: Grayscale, color-flow, and spectral imaging of the hepatic and portal vasculature was obtained.      Findings:  There is poor visualization of the splenic and superior mesenteric vein secondary to overlying bowel gas. There is absence of flow within the main portal vein, which is near anechoic suggests of acute thrombosis. No flow is noted within the right and  left portal veins, but this is more echogenic in nature and may be more chronic. The portal vein measures 1.4 cm in diameter.    Marked splenomegaly is identified. The spleen measures 7.6 x 23.3 x 23.4 cm. No significant ascites is identified. There is hepatopetal fugal flow within the hepatic veins.    Impression  Impression:    1. Portal vein thrombosis. The main portal vein appears to be relatively acutely thrombosed. The intrahepatic right and left portal vein branches appear to be more chronically occluded.  2. Massive  splenomegaly        Electronically Signed: Marvin Larose MD  1/4/2024 11:46 AM EST  Workstation ID: [id number]        Pending Labs       Order Current Status    ROLA In process    Anticardiolipin Antibody, IgG / M, Qn In process    Antithrombin III In process    Beta-2 Glycoprotein Antibodies In process    Christopher-Barr Virus VCA, IgM In process    Factor 5 Leiden In process    Factor II, DNA Analysis In process    Lupus Anticoagulant In process    Protein C Activity In process    Protein C Antigen, Total In process    Protein S Antigen, Free In process    Protein S Antigen, Total In process    Protein S Functional In process          Discharge Details        Discharge Medications        New Medications        Instructions Start Date   acetaminophen 325 MG tablet  Commonly known as: TYLENOL   650 mg, Oral, Every 4 Hours PRN      Enoxaparin Sodium 120 MG/0.8ML solution prefilled syringe syringe  Commonly known as: LOVENOX   1 mg/kg (120 mg), Subcutaneous, Every 12 Hours Scheduled             Changes to Medications        Instructions Start Date   Progesterone 100 MG capsule  Commonly known as: Prometrium  What changed: additional instructions   100 mg, Oral, Daily, HOLD this medication until re-evaluated by OB/GYN - hormonal therapy can increase risk of blood clots             Continue These Medications        Instructions Start Date   Prenatal 27-1 27-1 MG tablet tablet   1 tablet, Oral, Every Other Day, OTC               Allergies   Allergen Reactions    Penicillins Hives         Discharge Disposition:  Home or Self Care    Diet:  Hospital:  Diet Order   Procedures    Diet: Regular/House Diet; Texture: Regular Texture (IDDSI 7); Fluid Consistency: Thin (IDDSI 0)       Diet Instructions       Diet: Regular/House Diet; Regular Texture (IDDSI 7); Thin (IDDSI 0)      Discharge Diet: Regular/House Diet    Texture: Regular Texture (IDDSI 7)    Fluid Consistency: Thin (IDDSI 0)             Activity:  Activity Instructions        Activity as Tolerated                   CODE STATUS:    Code Status and Medical Interventions:   Ordered at: 01/03/24 1245     Code Status (Patient has no pulse and is not breathing):    CPR (Attempt to Resuscitate)     Medical Interventions (Patient has pulse or is breathing):    Full Support       Future Appointments   Date Time Provider Department Center   1/12/2024  9:00 AM Jde Brown MD [facility]       Additional Instructions for the Follow-ups that You Need to Schedule       Discharge Follow-up with PCP   As directed       Currently Documented PCP:    Provider, No Known    PCP Phone Number:    [phone]     Follow Up Details: 1-2 weeks                      Lizbeth Bailey MD  01/05/24      Time Spent on Discharge:  I spent  25  minutes on this discharge activity which included: face-to-face encounter with the patient, reviewing the data in the system, coordination of the care with the nursing staff as well as consultants, documentation, and entering orders.

## 2024-01-06 LAB
APTT HEX PL PPP: 5 SEC (ref 0–11)
APTT SCREEN TO CONFIRM RATIO: 0.84 RATIO (ref 0–1.34)
CONFIRM APTT/NORMAL: 41.2 SEC (ref 0–47.6)
LA 2 SCREEN W REFLEX-IMP: ABNORMAL
MIXING APTT: 48.5 SEC (ref 0–40.5)
PROT C ACT/NOR PPP: 81 % (ref 73–180)
PROT S ACT/NOR PPP: 61 % (ref 63–140)
PROT S AG ACT/NOR PPP IA: 107 % (ref 60–150)
PROT S FREE AG ACT/NOR PPP IA: 77 % (ref 61–136)
SCREEN APTT: 47.7 SEC (ref 0–43.5)
SCREEN DRVVT: 43.1 SEC (ref 0–47)
THROMBIN TIME: 14.9 SEC (ref 0–23)

## 2024-01-08 LAB
B2 GLYCOPROT1 IGA SER-ACNC: <9 GPI IGA UNITS (ref 0–25)
B2 GLYCOPROT1 IGG SER-ACNC: <9 GPI IGG UNITS (ref 0–20)
B2 GLYCOPROT1 IGM SER-ACNC: <9 GPI IGM UNITS (ref 0–32)
PROT C AG ACT/NOR PPP IA: 64 % (ref 60–150)

## 2024-01-10 NOTE — ED PROVIDER NOTES
Subjective   History of Present Illness  Pt is a pleasant 38 year old female who presents to the emergency department with months of intermittent upper abdominal pain. Pain has been worse for the past several days.  She presented to [facility] emergency department yesterday for this pain and had a CT scan performed which noted enlargement of the pancreatic head.  Lipase was normal and she was discharged to follow-up.  Ultrasound is currently pending.  Unfortunately despite that receiving pain medication and nausea medication her pain has not been adequately controlled and this worsening pain is what prompted her visit to the emergency department today.      Review of Systems   All other systems reviewed and are negative.      Past Medical History:   Diagnosis Date    History of recurrent miscarriages     X 3       Allergies   Allergen Reactions    Penicillins Hives       History reviewed. No pertinent surgical history.    Family History   Problem Relation Age of Onset    No Known Problems Father     No Known Problems Mother        Social History     Socioeconomic History    Marital status:     Number of children: 1   Tobacco Use    Smoking status: Never    Smokeless tobacco: Never   Vaping Use    Vaping Use: Never used   Substance and Sexual Activity    Alcohol use: No    Drug use: No    Sexual activity: Yes     Partners: Male           Objective   Physical Exam  Vitals and nursing note reviewed.   Constitutional:       General: She is not in acute distress.     Appearance: She is well-developed.   HENT:      Head: Normocephalic and atraumatic.   Eyes:      Conjunctiva/sclera: Conjunctivae normal.      Pupils: Pupils are equal, round, and reactive to light.   Neck:      Thyroid: No thyromegaly.   Cardiovascular:      Rate and Rhythm: Normal rate and regular rhythm.      Heart sounds: Normal heart sounds. No murmur heard.     No friction rub. No gallop.   Pulmonary:      Effort: Pulmonary effort is normal. No  respiratory distress.      Breath sounds: Normal breath sounds.   Abdominal:      General: Bowel sounds are normal.      Palpations: Abdomen is soft.      Tenderness: There is no abdominal tenderness.   Musculoskeletal:         General: Normal range of motion.      Cervical back: Normal range of motion and neck supple.   Lymphadenopathy:      Cervical: No cervical adenopathy.   Skin:     General: Skin is warm and dry.      Capillary Refill: Capillary refill takes less than 2 seconds.   Neurological:      General: No focal deficit present.      Mental Status: She is alert and oriented to person, place, and time.         Procedures           ED Course  ED Course as of 01/10/24 1019   Wed Jan 03, 2024   1009 Patient has a radiologist interpretation from recent CT imaging in [place].  Notes given these findings patient presentation is consistent with pancreatitis.  She ishas already failed outpatient treatment x 1.  Given her worsening pain admission at this time is most appropriate.  Ultrasound of the gallbladder and possible further imaging of the pancreas to further characterize the enlargement is also likely indicated.  I have paged the hospitalist for admission. [CP]   1159 Failure of outpatient treatment with epigastric pain and abnormal CT scan.  Went to [facility] 2 days ago and had a CT scan performed which noted enlargement of the pancreatic head concerning for pancreatitis versus neoplasm.  Also was noted to have gallbladder sludge.  She was given pain medicine and attempted to coordinate further outpatient imaging and workup.  Unfortunately her pain has worsened despite the hydrocodone.  Surprisingly, although her clinical presentation is consistent with pancreatitis and her CT scan is consistent with pancreatitis, her lipase is normal.  Given this pancreatic abnormality and now intractable epigastric pain and failure of outpatient treatment I think she would benefit from a inpatient workup. [CP]       ED Course User Index  [CP] Nathaniel Larson,                                  No results found for this or any previous visit (from the past 24 hour(s)).  Note: In addition to lab results from this visit, the labs listed above may include labs taken at another facility or during a different encounter within the last 24 hours. Please correlate lab times with ED admission and discharge times for further clarification of the services performed during this visit.    CT Abdomen Pelvis With & Without Contrast   Final Result   Impression:      1. 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 Edema of the small bowel mesentery.      2. Mildly enlarged and lobular appearing pancreatic head. No well-defined discrete mass can be demonstrated and there is no evidence of pancreatic ductal dilatation to infer the presence of neoplasm.      3. Marked splenomegaly, and suspected small left upper pole splenic infarct.      4. Noncalcified cholelithiasis.      Electronically Signed: Harlan Manzanares MD     1/4/2024 7:03 PM EST     Workstation ID: [id number]      MRI abdomen wo contrast mrcp   Final Result   Impression:   1.Cholelithiasis without evidence of acute cholecystitis.   2.Normal MR appearance of the biliary tree and pancreatic duct.   3.Marked splenomegaly. A wedge-shaped area of altered signal in the upper anterior portion of the spleen. This is indeterminate without IV contrast. This could represent a splenic infarct.   4.Abnormal flow void in the portal vein and superior mesenteric vein. This could be related to slow flow from portal hypertension or thrombosis. Evaluation is limited without IV contrast. The spleen and portal vein could be further evaluated with    multiphase contrast-enhanced CT.   5.Mild hepatomegaly.            Electronically Signed: Carmelo Sanchez MD     1/4/2024 2:03 AM EST     Workstation ID: [id number]        Vitals:     01/04/24 2300 01/05/24 0249 01/05/24 0300 01/05/24 0700   BP: 122/69  126/84 127/77   BP Location: Right arm  Right arm Right arm   Patient Position: Lying  Lying Lying   Pulse: 88 95 78 83   Resp: 18  18 18   Temp: 98 °F (36.7 °C)  98.1 °F (36.7 °C) 97.6 °F (36.4 °C)   TempSrc: Tympanic  Tympanic Oral   SpO2:  94% 94% 96%   Weight:       Height:         Medications   iopamidol (ISOVUE-370) 76 % injection 100 mL (95 mL Intravenous Given 1/4/24 1821)     ECG/EMG Results (last 24 hours)       ** No results found for the last 24 hours. **          No orders to display        Latest Reference Range & Units 01/03/24 09:04 01/03/24 09:08   Sodium 136 - 145 mmol/L  136   Potassium 3.5 - 5.2 mmol/L  3.7   Chloride 98 - 107 mmol/L  101   CO2 22.0 - 29.0 mmol/L  22.0   Anion Gap 5.0 - 15.0 mmol/L  13.0   BUN 6 - 20 mg/dL  9   Creatinine 0.57 - 1.00 mg/dL  0.78   BUN/Creatinine Ratio 7.0 - 25.0   11.5   eGFR >60.0 mL/min/1.73  99.2   Glucose 65 - 99 mg/dL  98   Calcium 8.6 - 10.5 mg/dL  8.7   Alkaline Phosphatase 39 - 117 U/L  179 (H)   Total Protein 6.0 - 8.5 g/dL  6.8   Albumin 3.5 - 5.2 g/dL  3.9   Globulin gm/dL  2.9   A/G Ratio g/dL  1.3   AST (SGOT) 1 - 32 U/L  41 (H)   ALT (SGPT) 1 - 33 U/L  46 (H)   Total Bilirubin 0.0 - 1.2 mg/dL  1.0   Triglycerides 0 - 150 mg/dL  93   Lipase 13 - 60 U/L  27   WBC 3.40 - 10.80 10*3/mm3  8.95   RBC 3.77 - 5.28 10*6/mm3  5.09   Hemoglobin 12.0 - 15.9 g/dL  11.8 (L)   Hematocrit 34.0 - 46.6 %  38.3   Platelets 140 - 450 10*3/mm3  333   RDW 12.3 - 15.4 %  20.9 (H)   MCV 79.0 - 97.0 fL  75.2 (L)   MCH 26.6 - 33.0 pg  23.2 (L)   MCHC 31.5 - 35.7 g/dL  30.8 (L)   RDW-SD 37.0 - 54.0 fl  54.6 (H)   Neutrophil Rel % 42.7 - 76.0 %  73.8   Lymphocyte Rel % 19.6 - 45.3 %  15.8 (L)   Monocyte Rel % 5.0 - 12.0 %  7.4   Eosinophil Rel % 0.3 - 6.2 %  1.3   Basophil Rel % 0.0 - 1.5 %  0.9   Immature Granulocyte Rel % 0.0 - 0.5 %  0.8 (H)   Neutrophils Absolute 1.70 - 7.00 10*3/mm3  6.61   Lymphocytes  Absolute 0.70 - 3.10 10*3/mm3  1.41   Monocytes Absolute 0.10 - 0.90 10*3/mm3  0.66   Eosinophils Absolute 0.00 - 0.40 10*3/mm3  0.12   Basophils Absolute 0.00 - 0.20 10*3/mm3  0.08   Immature Grans, Absolute 0.00 - 0.05 10*3/mm3  0.07 (H)   WBC Morphology Normal   Normal   Dacrocytes None Seen   Slight/1+   Ovalocytes None Seen   Mod/2+   Platelet Morphology Normal   Normal   nRBC 0.0 - 0.2 /100 WBC  0.0   Microcytes None Seen   Slight/1+   Hypochromia None Seen   Slight/1+   Anisocytes None Seen   Mod/2+   Color, UA Yellow, Straw  Yellow    Appearance, UA Clear  Cloudy !    Specific Gravity, UA 1.001 - 1.030  1.015    pH, UA 5.0 - 8.0  5.5    Glucose Negative  Negative    Ketones, UA Negative  Negative    Blood, UA Negative  Negative    Nitrite, UA Negative  Negative    Leukocytes, UA Negative  Trace !    Protein, UA Negative  Negative    Bilirubin, UA Negative  Negative    Urobilinogen, UA 0.2 - 1.0 E.U./dL  1.0 E.U./dL    RBC, UA None Seen, 0-2 /HPF 11-20 !    WBC, UA None Seen, 0-2 /HPF 11-20 !    Bacteria, UA None Seen, Trace /HPF 1+ !    Squamous Epithelial Cells, UA None Seen, 0-2 /HPF 13-20 !    Hyaline Casts, UA 0 - 6 /LPF 0-6    Methodology:  Automated Microscopy    (H): Data is abnormally high  (L): Data is abnormally low  !: Data is abnormal            Medical Decision Making  Amount and/or Complexity of Data Reviewed  External Data Reviewed: notes.  Labs: ordered. Decision-making details documented in ED Course.  Radiology: ordered and independent interpretation performed. Decision-making details documented in ED Course.  ECG/medicine tests: ordered and independent interpretation performed. Decision-making details documented in ED Course.    Risk  Decision regarding hospitalization.        Final diagnoses:   Upper abdominal pain   Abnormal CT of the abdomen   Pancreatic enlargement   Splenomegaly   Failure of outpatient treatment   Intractable pain       ED Disposition  ED Disposition       ED  Disposition   Decision to Admit    Condition   --    Comment   Level of Care: Med/Surg [1]   Diagnosis: Pancreatitis [202663]                  Nathaniel Larson DO  01/10/24 1020

## 2024-01-12 ENCOUNTER — OFFICE VISIT (OUTPATIENT)
Dept: ONCOLOGY | Facility: CLINIC | Age: 39
End: 2024-01-12
Payer: COMMERCIAL

## 2024-01-12 VITALS
WEIGHT: 262 LBS | BODY MASS INDEX: 41.12 KG/M2 | RESPIRATION RATE: 18 BRPM | OXYGEN SATURATION: 96 % | HEART RATE: 96 BPM | TEMPERATURE: 97.6 F | SYSTOLIC BLOOD PRESSURE: 123 MMHG | DIASTOLIC BLOOD PRESSURE: 81 MMHG | HEIGHT: 67 IN

## 2024-01-12 DIAGNOSIS — R16.1 SPLENOMEGALY: Chronic | ICD-10-CM

## 2024-01-12 DIAGNOSIS — D50.0 IRON DEFICIENCY ANEMIA DUE TO CHRONIC BLOOD LOSS: ICD-10-CM

## 2024-01-12 DIAGNOSIS — I81 PORTAL VEIN THROMBOSIS: Primary | Chronic | ICD-10-CM

## 2024-01-12 RX ORDER — FERROUS SULFATE 325(65) MG
TABLET ORAL
Qty: 30 TABLET | Refills: 11 | Status: SHIPPED | OUTPATIENT
Start: 2024-01-12

## 2024-01-12 NOTE — PROGRESS NOTES
"CHIEF COMPLAINT: No new somatic complaints    Problem List:  Oncology/Hematology History    No history exists.       HISTORY OF PRESENT ILLNESS:  The patient is a 38 y.o. female, here for follow up on management of the vein thrombosis with splenomegaly    Past Medical History:   Diagnosis Date    History of recurrent miscarriages     X 3     No past surgical history on file.    Allergies   Allergen Reactions    Penicillins Hives       Family History and Social History reviewed and changed as necessary    REVIEW OF SYSTEM:   No new somatic complaints.  No abdominal tenderness    PHYSICAL EXAM:  No jaundice icterus or pallor.    Vitals:    01/12/24 0914   BP: 123/81   Pulse: 96   Resp: 18   Temp: 97.6 °F (36.4 °C)   TempSrc: Temporal   SpO2: 96%   Weight: 119 kg (262 lb)   Height: 170.2 cm (67.01\")     Vitals:    01/12/24 0914   PainSc:   2          ECOG score: 0           Vitals reviewed.    ECOG: (0) Fully Active - Able to Carry On All Pre-disease Performance Without Restriction    Lab Results   Component Value Date    HGB 11.7 (L) 01/05/2024    HCT 38.9 01/05/2024    MCV 76.7 (L) 01/05/2024     01/05/2024    WBC 7.93 01/05/2024    NEUTROABS 6.61 01/03/2024    LYMPHSABS 1.41 01/03/2024    MONOSABS 0.66 01/03/2024    EOSABS 0.12 01/03/2024    BASOSABS 0.08 01/03/2024       Lab Results   Component Value Date    GLUCOSE 100 (H) 01/05/2024    BUN 6 01/05/2024    CREATININE 0.76 01/05/2024     01/05/2024    K 3.8 01/05/2024     01/05/2024    CO2 24.0 01/05/2024    CALCIUM 8.7 01/05/2024    PROTEINTOT 6.6 01/05/2024    ALBUMIN 3.9 01/05/2024    BILITOT 0.8 01/05/2024    ALKPHOS 194 (H) 01/05/2024    AST 21 01/05/2024    ALT 30 01/05/2024             ASSESSMENT & PLAN:  1.  Portal vein thrombosis with massive splenomegaly mild elevation of liver enzymes with recurrent miscarriages:      -1/12/2024 [facility] outpatient follow-up: I had initially seen the patient as an inpatient on 1/4/2024 with a " fairly strong suspicion for antiphospholipid antibody syndrome with the above history and had her discharged on Lovenox 1 mg/kg subcu every 12 hours until results of hypercoagulable panel were back to decide as to transitioning to Coumadin versus.  The following is the results of her hypercoagulable panel…  Protein C antigen 64%, protein C activity 81% normal.  Protein S functional 61% lower limit of normal 63%, protein S antigen free 77%, protein S antigen total 107%.  Antithrombin %.  Beta-2 glycoprotein/anticardiolipin antibody/lupus anticoagulant negative.  PTT normal.  INR 1.36  Prothrombin gene mutation/factor V Leiden mutation negative.  Homocystine 8.3.  EBV IgM negative  ROLA negative  Alkaline phosphatase 194 glucose 100 otherwise unremarkable CMP.  Hemoglobin 11.7 MCV 76.7.    Hence, she has no evidence of hypercoagulable condition.  I will check a P[facility] panel for completeness sake given the oddity of an unprovoked portal vein thrombus and I have asked her to get with her primary care to get a mammogram and to see OB/GYN to make sure no malignancy of her pelvic organs though that is also low of the likelihood.  Lastly with her microcytic anemia likely due to her menstrual flow I will check her iron studies with CBC and place her on ferrous sulfate 325 mg every other day.  I will see her back in a few weeks to go over these labs and assuming no PNH and she is tolerating the oral iron then she will follow-up with primary care and OB/GYN for those loose ends and I recommended a baseline colonoscopy at age 44 and sooner if she is not correcting her hemoglobin with oral iron if her menses are abating but she could have increased menstrual flow due to lifetime Eliquis which we are now switching her to given no antiphospholipid antibody syndrome.  Her protein S functional just barely below normal with otherwise normal antigen levels makes protein S deficiency as a cause very unlikely and clot itself can  cause this mild degree of low functional protein S.  She knows how to switch out the Eliquis 1: 1 with her Lovenox.  She simply replaces the Eliquis dose at the time of her next Lovenox dose and stopped the Lovenox.  She will hang onto her Lovenox just in case she has trouble.  She failed Eliquis she would need lifetime Lovenox most likely given the lack of antiphospholipid antibody syndrome.    Total time of care today inclusive of time spent today prior to patient's arrival reviewing interval data and during visit interviewing her as to signs or symptoms of her disease and management thereof and after visit instituting this plan took 50 minutes patient care time throughout the day today.  As well as prescribing medications as outlined  Jed Brown MD    01/12/2024

## 2024-01-16 ENCOUNTER — LAB (OUTPATIENT)
Dept: LAB | Facility: HOSPITAL | Age: 39
End: 2024-01-16
Payer: COMMERCIAL

## 2024-01-16 DIAGNOSIS — D50.0 IRON DEFICIENCY ANEMIA DUE TO CHRONIC BLOOD LOSS: ICD-10-CM

## 2024-01-16 DIAGNOSIS — R16.1 SPLENOMEGALY: ICD-10-CM

## 2024-01-16 DIAGNOSIS — I81 PORTAL VEIN THROMBOSIS: ICD-10-CM

## 2024-01-16 LAB
BASOPHILS # BLD AUTO: 0.08 10*3/MM3 (ref 0–0.2)
BASOPHILS NFR BLD AUTO: 0.9 % (ref 0–1.5)
DEPRECATED RDW RBC AUTO: 55.2 FL (ref 37–54)
EOSINOPHIL # BLD AUTO: 0.14 10*3/MM3 (ref 0–0.4)
EOSINOPHIL NFR BLD AUTO: 1.6 % (ref 0.3–6.2)
ERYTHROCYTE [DISTWIDTH] IN BLOOD BY AUTOMATED COUNT: 20.6 % (ref 12.3–15.4)
FERRITIN SERPL-MCNC: 64.54 NG/ML (ref 13–150)
HCT VFR BLD AUTO: 36.9 % (ref 34–46.6)
HGB BLD-MCNC: 11.2 G/DL (ref 12–15.9)
IMM GRANULOCYTES # BLD AUTO: 0.05 10*3/MM3 (ref 0–0.05)
IMM GRANULOCYTES NFR BLD AUTO: 0.6 % (ref 0–0.5)
IRON 24H UR-MRATE: 17 MCG/DL (ref 37–145)
IRON SATN MFR SERPL: 4 % (ref 20–50)
LYMPHOCYTES # BLD AUTO: 1.4 10*3/MM3 (ref 0.7–3.1)
LYMPHOCYTES NFR BLD AUTO: 15.6 % (ref 19.6–45.3)
MCH RBC QN AUTO: 23 PG (ref 26.6–33)
MCHC RBC AUTO-ENTMCNC: 30.4 G/DL (ref 31.5–35.7)
MCV RBC AUTO: 75.8 FL (ref 79–97)
MONOCYTES # BLD AUTO: 0.63 10*3/MM3 (ref 0.1–0.9)
MONOCYTES NFR BLD AUTO: 7 % (ref 5–12)
NEUTROPHILS NFR BLD AUTO: 6.66 10*3/MM3 (ref 1.7–7)
NEUTROPHILS NFR BLD AUTO: 74.3 % (ref 42.7–76)
PLATELET # BLD AUTO: 536 10*3/MM3 (ref 140–450)
PMV BLD AUTO: 9.8 FL (ref 6–12)
RBC # BLD AUTO: 4.87 10*6/MM3 (ref 3.77–5.28)
TIBC SERPL-MCNC: 438 MCG/DL (ref 298–536)
TRANSFERRIN SERPL-MCNC: 294 MG/DL (ref 200–360)
WBC NRBC COR # BLD AUTO: 8.96 10*3/MM3 (ref 3.4–10.8)

## 2024-01-16 PROCEDURE — 83540 ASSAY OF IRON: CPT

## 2024-01-16 PROCEDURE — 36415 COLL VENOUS BLD VENIPUNCTURE: CPT

## 2024-01-16 PROCEDURE — 82728 ASSAY OF FERRITIN: CPT

## 2024-01-16 PROCEDURE — 84466 ASSAY OF TRANSFERRIN: CPT

## 2024-01-16 PROCEDURE — 85025 COMPLETE CBC W/AUTO DIFF WBC: CPT

## 2024-01-18 LAB — PNH RESULT: NORMAL

## 2024-02-02 ENCOUNTER — OFFICE VISIT (OUTPATIENT)
Dept: ONCOLOGY | Facility: CLINIC | Age: 39
End: 2024-02-02
Payer: COMMERCIAL

## 2024-02-02 VITALS
HEART RATE: 81 BPM | HEIGHT: 67 IN | WEIGHT: 264 LBS | TEMPERATURE: 98.1 F | BODY MASS INDEX: 41.44 KG/M2 | RESPIRATION RATE: 18 BRPM | DIASTOLIC BLOOD PRESSURE: 70 MMHG | OXYGEN SATURATION: 98 % | SYSTOLIC BLOOD PRESSURE: 139 MMHG

## 2024-02-02 DIAGNOSIS — K92.1 MELENA: ICD-10-CM

## 2024-02-02 DIAGNOSIS — D68.59 HYPERCOAGULABLE STATE: Primary | ICD-10-CM

## 2024-02-02 DIAGNOSIS — Z12.39 ENCOUNTER FOR SCREENING FOR MALIGNANT NEOPLASM OF BREAST, UNSPECIFIED SCREENING MODALITY: ICD-10-CM

## 2024-02-02 NOTE — PROGRESS NOTES
CHIEF COMPLAINT: No new somatic complaints    Problem List:  Oncology/Hematology History Overview Note   1.  Portal vein thrombosis with massive splenomegaly and mild elevation of liver enzymes with recurrent miscarriages with negative hypercoagulable workup:     -1/4/2024 [facility] inpatient hematology initial consultation: I strongly suspect antiphospholipid antibody syndrome.  Treatment of choice long-term in that case would be Coumadin not a DOAC.  In the near term, I would suggest putting her on Lovenox 1 mg/kg subcu every 12 hours.  Provided that she has enough supply to last her for the next couple of weeks and that this is procure before her discharge, I would be Okay with her going home on that and her hypercoagulable workup has been sent as reviewed with her hospitalist earlier today.  I will plan on seeing her back in the next couple of weeks and hopefully by that point we will have the results of her hypercoagulable workup to make the decision of what drug to transition to from her Lovenox as to Coumadin versus Eliquis.    -1/5/2024 [facility] inpatient hematology follow-up:Protein C antigen and activity, protein S antigen total and free and functional, beta-2 glycoprotein, anticardiolipin, lupus anticoagulant and Antithrombin III all pending.Prothrombin gene mutation and factor V Leiden mutation negative.  ROLA negative.  Has a 2-week supply of Lovenox and we should have results on these tests by the 12th when I see her back in my office to discern as to transitioning to Coumadin versus switching to Eliquis.  Nothing on MRI abdomen MRCP or CT abdomen pelvis With and without contrast to suggest a pancreatic neoplasm.  The pancreatic head was mildly enlarged and lobular but no discrete mass.  Hemoglobin 11.7 with MCV 76.7 and I will repeat her CBC and get iron indices when I see her back but at 39 odds of malignancy related cause of anemia low but we can consider Cologuard when I see her back.  Alkaline  phosphatase 194 today with ALT 30 AST 21 now normal with normal bilirubin 0.8 and glucose 100.  Patient ready for discharge.     --1/12/2024 [facility] hematology outpatient follow-up: I had initially seen the patient as an inpatient on 1/4/2024 with a fairly strong suspicion for antiphospholipid antibody syndrome with the above history and had her discharged on Lovenox 1 mg/kg subcu every 12 hours until results of hypercoagulable panel were back to decide as to transitioning to Coumadin versus.  The following is the results of her hypercoagulable panel…  Protein C antigen 64%, protein C activity 81% normal.  Protein S functional 61% lower limit of normal 63%, protein S antigen free 77%, protein S antigen total 107%.  Antithrombin %.  Beta-2 glycoprotein/anticardiolipin antibody/lupus anticoagulant negative.  PTT normal.  INR 1.36  Prothrombin gene mutation/factor V Leiden mutation negative.  Homocystine 8.3.  EBV IgM negative  ROLA negative  Alkaline phosphatase 194 glucose 100 otherwise unremarkable CMP.  Hemoglobin 11.7 MCV 76.7.     Hence, she has no evidence of hypercoagulable condition.  I will check a PNH panel for completeness sake given the oddity of an unprovoked portal vein thrombus and I have asked her to get with her primary care to get a mammogram and to see OB/GYN to make sure no malignancy of her pelvic organs though that is also low of the likelihood.  Lastly with her microcytic anemia likely due to her menstrual flow I will check her iron studies with CBC and place her on ferrous sulfate 325 mg every other day.  I will see her back in a few weeks to go over these labs and assuming no PNH and she is tolerating the oral iron then she will follow-up with primary care and OB/GYN for those loose ends and I recommended a baseline colonoscopy at age 44 and sooner if she is not correcting her hemoglobin with oral iron if her menses are abating but she could have increased menstrual flow due to lifetime  Eliquis which we are now switching her to given no antiphospholipid antibody syndrome.  Her protein S functional just barely below normal with otherwise normal antigen levels makes protein S deficiency as a cause very unlikely and clot itself can cause this mild degree of low functional protein S.  She knows how to switch out the Eliquis 1: 1 with her Lovenox.  She simply replaces the Eliquis dose at the time of her next Lovenox dose and stopped the Lovenox.  She will hang onto her Lovenox just in case she has trouble.  if she failed Eliquis, she would need lifetime Lovenox most likely given the lack of antiphospholipid antibody syndrome.    -1/16/2024 hemoglobin 11.2 MCV 75.8 platelets 536,000 otherwise unremarkable CBC and differential.  Ferritin normal 64.54.  Iron low 17 with saturation low 4% and transferrin 294 with total iron binding capacity 438  No evidence of PNH    -2/2/24 [facility] hematology follow-up: I reviewed the above data with the patient.  Complete hypercoagulable workup negative save for slightly low functional protein S that I do not think is significant.  She is iron deficient and almost assuredly due to menses but for completeness sake I will order a Cologuard on her stool and mammograms and she will see my nurse practitioner back to go over results.  If the Cologuard is positive then she will need to get colonoscopy with cessation of her Eliquis a couple days in advance with some attendant risk of further portal vein thrombosis.  If all of this is negative, other than for making sure that she gets a thorough gynecologic exam to rule out pelvic malignancy as a source of hypercoagulability, and then I would do no further workup as to the etiology of her thrombus that we have worked up thoroughly as above.  She can then just follow with her primary care for refills of Eliquis lifetime as outlined.  Her iron indices are consistent with partially treated iron deficiency likely just due to menses  "but we shall see.     Portal vein thrombosis       HISTORY OF PRESENT ILLNESS:  The patient is a 38 y.o. female, here for follow up on management of portal vein thrombosis with splenomegaly and liver enzyme elevation subsequently resolved normal liver enzymes on repeat testing on anticoagulation with negative hypercoagulable workup    Past Medical History:   Diagnosis Date    History of recurrent miscarriages     X 3     History reviewed. No pertinent surgical history.    Allergies   Allergen Reactions    Penicillins Hives       Family History and Social History reviewed and changed as necessary    REVIEW OF SYSTEM:   No new somatic complaints    PHYSICAL EXAM:  No jaundice or icterus or pallor.  No palpable splenomegaly but exam hampered by body habitus    Vitals:    02/02/24 0823   BP: 139/70   Pulse: 81   Resp: 18   Temp: 98.1 °F (36.7 °C)   SpO2: 98%   Weight: 120 kg (264 lb)   Height: 170.2 cm (67\")     Vitals:    02/02/24 0823   PainSc: 0-No pain          ECOG score: 0           Vitals reviewed.    ECOG: (0) Fully Active - Able to Carry On All Pre-disease Performance Without Restriction    Lab Results   Component Value Date    HGB 11.2 (L) 01/16/2024    HCT 36.9 01/16/2024    MCV 75.8 (L) 01/16/2024     (H) 01/16/2024    WBC 8.96 01/16/2024    NEUTROABS 6.66 01/16/2024    LYMPHSABS 1.40 01/16/2024    MONOSABS 0.63 01/16/2024    EOSABS 0.14 01/16/2024    BASOSABS 0.08 01/16/2024       Lab Results   Component Value Date    GLUCOSE 100 (H) 01/05/2024    BUN 6 01/05/2024    CREATININE 0.76 01/05/2024     01/05/2024    K 3.8 01/05/2024     01/05/2024    CO2 24.0 01/05/2024    CALCIUM 8.7 01/05/2024    PROTEINTOT 6.6 01/05/2024    ALBUMIN 3.9 01/05/2024    BILITOT 0.8 01/05/2024    ALKPHOS 194 (H) 01/05/2024    AST 21 01/05/2024    ALT 30 01/05/2024             ASSESSMENT & PLAN:  1.  Portal vein thrombosis with massive splenomegaly and mild elevation of liver enzymes with recurrent miscarriages " with negative hypercoagulable workup:     -1/4/2024 [facility] inpatient hematology initial consultation: I strongly suspect antiphospholipid antibody syndrome.  Treatment of choice long-term in that case would be Coumadin not a DOAC.  In the near term, I would suggest putting her on Lovenox 1 mg/kg subcu every 12 hours.  Provided that she has enough supply to last her for the next couple of weeks and that this is procure before her discharge, I would be Okay with her going home on that and her hypercoagulable workup has been sent as reviewed with her hospitalist earlier today.  I will plan on seeing her back in the next couple of weeks and hopefully by that point we will have the results of her hypercoagulable workup to make the decision of what drug to transition to from her Lovenox as to Coumadin versus Eliquis.    -1/5/2024 [facility] inpatient hematology follow-up:Protein C antigen and activity, protein S antigen total and free and functional, beta-2 glycoprotein, anticardiolipin, lupus anticoagulant and Antithrombin III all pending.Prothrombin gene mutation and factor V Leiden mutation negative.  ROLA negative.  Has a 2-week supply of Lovenox and we should have results on these tests by the 12th when I see her back in my office to discern as to transitioning to Coumadin versus switching to Eliquis.  Nothing on MRI abdomen MRCP or CT abdomen pelvis With and without contrast to suggest a pancreatic neoplasm.  The pancreatic head was mildly enlarged and lobular but no discrete mass.  Hemoglobin 11.7 with MCV 76.7 and I will repeat her CBC and get iron indices when I see her back but at 39 odds of malignancy related cause of anemia low but we can consider Cologuard when I see her back.  Alkaline phosphatase 194 today with ALT 30 AST 21 now normal with normal bilirubin 0.8 and glucose 100.  Patient ready for discharge.     --1/12/2024 [facility] hematology outpatient follow-up: I had initially seen the patient as an  inpatient on 1/4/2024 with a fairly strong suspicion for antiphospholipid antibody syndrome with the above history and had her discharged on Lovenox 1 mg/kg subcu every 12 hours until results of hypercoagulable panel were back to decide as to transitioning to Coumadin versus.  The following is the results of her hypercoagulable panel…  Protein C antigen 64%, protein C activity 81% normal.  Protein S functional 61% lower limit of normal 63%, protein S antigen free 77%, protein S antigen total 107%.  Antithrombin %.  Beta-2 glycoprotein/anticardiolipin antibody/lupus anticoagulant negative.  PTT normal.  INR 1.36  Prothrombin gene mutation/factor V Leiden mutation negative.  Homocystine 8.3.  EBV IgM negative  ROLA negative  Alkaline phosphatase 194 glucose 100 otherwise unremarkable CMP.  Hemoglobin 11.7 MCV 76.7.     Hence, she has no evidence of hypercoagulable condition.  I will check a [facility] panel for completeness sake given the oddity of an unprovoked portal vein thrombus and I have asked her to get with her primary care to get a mammogram and to see OB/GYN to make sure no malignancy of her pelvic organs though that is also low of the likelihood.  Lastly with her microcytic anemia likely due to her menstrual flow I will check her iron studies with CBC and place her on ferrous sulfate 325 mg every other day.  I will see her back in a few weeks to go over these labs and assuming no PNH and she is tolerating the oral iron then she will follow-up with primary care and OB/GYN for those loose ends and I recommended a baseline colonoscopy at age 44 and sooner if she is not correcting her hemoglobin with oral iron if her menses are abating but she could have increased menstrual flow due to lifetime Eliquis which we are now switching her to given no antiphospholipid antibody syndrome.  Her protein S functional just barely below normal with otherwise normal antigen levels makes protein S deficiency as a cause very  unlikely and clot itself can cause this mild degree of low functional protein S.  She knows how to switch out the Eliquis 1: 1 with her Lovenox.  She simply replaces the Eliquis dose at the time of her next Lovenox dose and stopped the Lovenox.  She will hang onto her Lovenox just in case she has trouble.  if she failed Eliquis, she would need lifetime Lovenox most likely given the lack of antiphospholipid antibody syndrome.    -1/16/2024 hemoglobin 11.2 MCV 75.8 platelets 536,000 otherwise unremarkable CBC and differential.  Ferritin normal 64.54.  Iron low 17 with saturation low 4% and transferrin 294 with total iron binding capacity 438  No evidence of PNH    -2/2/24 [facility] hematology follow-up: I reviewed the above data with the patient.  Complete hypercoagulable workup negative save for slightly low functional protein S that I do not think is significant.  She is iron deficient and almost assuredly due to menses but for completeness sake I will order a Cologuard on her stool and mammograms and she will see my nurse practitioner back to go over results.  If the Cologuard is positive then she will need to get colonoscopy with cessation of her Eliquis a couple days in advance with some attendant risk of further portal vein thrombosis.  If all of this is negative, other than for making sure that she gets a thorough gynecologic exam to rule out pelvic malignancy as a source of hypercoagulability, and then I would do no further workup as to the etiology of her thrombus that we have worked up thoroughly as above.  She can then just follow with her primary care for refills of Eliquis lifetime as outlined.  Her iron indices are consistent with partially treated iron deficiency likely just due to menses but we shall see.    Total time of care today inclusive of time spent today prior to patient's arrival reviewing interval data and during visit translating to patient interviewing her as to signs or symptoms of the  causes and consequences and management of portal vein thrombosis with splenomegaly and after visit instituting this plan took 45 minutes of time today.  Jed Brown MD    02/02/2024

## 2024-02-05 ENCOUNTER — TELEPHONE (OUTPATIENT)
Dept: ONCOLOGY | Facility: CLINIC | Age: 39
End: 2024-02-05
Payer: COMMERCIAL

## 2024-02-05 NOTE — TELEPHONE ENCOUNTER
Called and spoke with Eleonora regarding patients order, RN was informed that this test is typically only covered by insurance for ages 45-85 at average risk for colon cancer so she would need to call her insurance and if they will cover testing (due to her being on a blood thinner and the risk of stopping the blood thinner for a colonoscopy) she would have to have them conference call [facility] at [phone] and they would have to provide an authorization number. She also has the option of paying cash price which is $382. Called patient and left a detailed VM with this information.

## 2024-02-06 NOTE — TELEPHONE ENCOUNTER
Called patient back and informed her [place] the test [place] 8[address]20. She verbalized understanding.

## 2024-02-06 NOTE — TELEPHONE ENCOUNTER
LILIYA IS CALLING ASKING FOR A CPT CODE FOR THE COLOR GUARD TEST    PLEASE CALL PATIENT WITH INFORMATION      PLEASE SEE PREVIOUS MESSAGE AND ADVISE

## 2024-02-06 NOTE — TELEPHONE ENCOUNTER
Caller: ISRAEL    Relationship to patient: [facility]    Best call back number: [phone]    ISRAEL IS CALLING TO SEE IF THIS WAS FOR PREVENTATIVE CARE OR DIAGNOSTIC. SHE CANNOT PROCESS UNTIL SHE KNOWS WHICH ONE IT IS FOR. SHE STATES SHE WILL HAVE THE PATIENT CALL BACK FOR THE ANSWER BECAUSE WHEN WE CALL HER OFFICE IT IS NOT GOING TO GO DIRECTLY TO HER. WE CAN PUT THE INFORMATION IN HER CHART.

## 2024-02-06 NOTE — TELEPHONE ENCOUNTER
Called and discussed with patient that the cologuard would be considered diagnostic since she is anemic. She verbalized understanding and states that since it is diagnostic her insurance told her that it would not be covered. Patient would prefer to avoid colonoscopy so she is going to pay the cash price for the cologuard, she was given the number to cologuard to call once she is ready to pay the cost of the test and then they will ship her the test kit. She verbalized understanding.

## 2024-02-09 ENCOUNTER — HOSPITAL ENCOUNTER (OUTPATIENT)
Dept: MAMMOGRAPHY | Facility: HOSPITAL | Age: 39
Discharge: HOME | End: 2024-02-09
Admitting: INTERNAL MEDICINE
Payer: COMMERCIAL

## 2024-02-09 DIAGNOSIS — D68.59 HYPERCOAGULABLE STATE: ICD-10-CM

## 2024-02-09 DIAGNOSIS — Z12.39 ENCOUNTER FOR SCREENING FOR MALIGNANT NEOPLASM OF BREAST, UNSPECIFIED SCREENING MODALITY: ICD-10-CM

## 2024-02-09 PROBLEM — D50.9 IRON DEFICIENCY ANEMIA: Status: ACTIVE | Noted: 2024-02-09

## 2024-02-09 PROBLEM — Z12.11 ENCOUNTER FOR SCREENING COLONOSCOPY: Status: ACTIVE | Noted: 2024-02-09

## 2024-02-09 PROCEDURE — 77067 SCR MAMMO BI INCL CAD: CPT

## 2024-02-09 PROCEDURE — 77063 BREAST TOMOSYNTHESIS BI: CPT

## 2024-02-14 ENCOUNTER — TELEPHONE (OUTPATIENT)
Dept: ONCOLOGY | Facility: CLINIC | Age: 39
End: 2024-02-14

## 2024-02-14 DIAGNOSIS — D50.0 IRON DEFICIENCY ANEMIA DUE TO CHRONIC BLOOD LOSS: Primary | ICD-10-CM

## 2024-02-14 NOTE — TELEPHONE ENCOUNTER
Caller: Robert Waldron    Relationship: Self    Best call back number:[phone]    What is the best time to reach you: ANY    Who are you requesting to speak with (clinical staff, provider,  specific staff member): CLINICAL         What was the call regarding: PATIENT CALLED TO SEE IF SHE SHOULD STILL COME IN FOR APPT ON FRIDAY 2/16/24. ROBERT STATES THAT SHE DID HER MAMMOGRAM APPT BUT DID NOT COMPLETE HER COLOGUARD LAB DUE TO HAVING TO PAY OUT OF POCKET. ROBERT WANTS TO KNOW IF SHE SHOULD STILL COME IN FRIDAY.     Is it okay if the provider responds through MyChart: NO

## 2024-03-15 ENCOUNTER — TELEPHONE (OUTPATIENT)
Dept: MAMMOGRAPHY | Facility: HOSPITAL | Age: 39
End: 2024-03-15
Payer: COMMERCIAL

## 2024-03-15 ENCOUNTER — DOCUMENTATION (OUTPATIENT)
Dept: MAMMOGRAPHY | Facility: HOSPITAL | Age: 39
End: 2024-03-15
Payer: COMMERCIAL

## 2024-03-15 ENCOUNTER — HOSPITAL ENCOUNTER (OUTPATIENT)
Dept: ULTRASOUND IMAGING | Facility: HOSPITAL | Age: 39
Discharge: HOME | End: 2024-03-15
Payer: COMMERCIAL

## 2024-03-15 ENCOUNTER — HOSPITAL ENCOUNTER (OUTPATIENT)
Dept: MAMMOGRAPHY | Facility: HOSPITAL | Age: 39
Discharge: HOME | End: 2024-03-15
Payer: COMMERCIAL

## 2024-03-15 DIAGNOSIS — R92.8 ABNORMAL MAMMOGRAM: ICD-10-CM

## 2024-03-15 PROCEDURE — 77065 DX MAMMO INCL CAD UNI: CPT

## 2024-03-15 PROCEDURE — 76642 ULTRASOUND BREAST LIMITED: CPT

## 2024-03-15 PROCEDURE — G0279 TOMOSYNTHESIS, MAMMO: HCPCS

## 2024-03-15 NOTE — TELEPHONE ENCOUNTER
Patient notified medication clearance received from Dr Jed Brown MD. Patient is aware to hold Eliquis x 2 days prior to procedure and is to resume the evening of the procedure. [facility] scheduling will contact patient to schedule procedure. Questions answered, support given. Patient is to contact prescribing provider with any medication questions. Patient verbalized understanding.

## 2024-03-18 ENCOUNTER — TRANSCRIBE ORDERS (OUTPATIENT)
Dept: ADMINISTRATIVE | Facility: HOSPITAL | Age: 39
End: 2024-03-18
Payer: COMMERCIAL

## 2024-03-18 DIAGNOSIS — R92.8 ABNORMAL MAMMOGRAM: Primary | ICD-10-CM

## 2024-03-22 ENCOUNTER — OFFICE VISIT (OUTPATIENT)
Dept: ONCOLOGY | Facility: CLINIC | Age: 39
End: 2024-03-22
Payer: COMMERCIAL

## 2024-03-22 ENCOUNTER — LAB (OUTPATIENT)
Dept: LAB | Facility: HOSPITAL | Age: 39
End: 2024-03-22
Payer: COMMERCIAL

## 2024-03-22 VITALS
TEMPERATURE: 97.5 F | RESPIRATION RATE: 18 BRPM | OXYGEN SATURATION: 98 % | BODY MASS INDEX: 41.59 KG/M2 | SYSTOLIC BLOOD PRESSURE: 134 MMHG | WEIGHT: 265 LBS | HEIGHT: 67 IN | HEART RATE: 88 BPM | DIASTOLIC BLOOD PRESSURE: 84 MMHG

## 2024-03-22 DIAGNOSIS — I81 PORTAL VEIN THROMBOSIS: Chronic | ICD-10-CM

## 2024-03-22 DIAGNOSIS — D50.8 OTHER IRON DEFICIENCY ANEMIA: Primary | ICD-10-CM

## 2024-03-22 DIAGNOSIS — D50.0 IRON DEFICIENCY ANEMIA DUE TO CHRONIC BLOOD LOSS: ICD-10-CM

## 2024-03-22 DIAGNOSIS — D50.8 OTHER IRON DEFICIENCY ANEMIA: ICD-10-CM

## 2024-03-22 LAB
BASOPHILS # BLD AUTO: 0.04 10*3/MM3 (ref 0–0.2)
BASOPHILS NFR BLD AUTO: 0.5 % (ref 0–1.5)
DEPRECATED RDW RBC AUTO: 48.5 FL (ref 37–54)
EOSINOPHIL # BLD AUTO: 0.12 10*3/MM3 (ref 0–0.4)
EOSINOPHIL NFR BLD AUTO: 1.6 % (ref 0.3–6.2)
ERYTHROCYTE [DISTWIDTH] IN BLOOD BY AUTOMATED COUNT: 18.7 % (ref 12.3–15.4)
FERRITIN SERPL-MCNC: 44.95 NG/ML (ref 13–150)
HCT VFR BLD AUTO: 36.9 % (ref 34–46.6)
HGB BLD-MCNC: 11.4 G/DL (ref 12–15.9)
IMM GRANULOCYTES # BLD AUTO: 0.02 10*3/MM3 (ref 0–0.05)
IMM GRANULOCYTES NFR BLD AUTO: 0.3 % (ref 0–0.5)
IRON 24H UR-MRATE: 28 MCG/DL (ref 37–145)
IRON SATN MFR SERPL: 5 % (ref 20–50)
LYMPHOCYTES # BLD AUTO: 1.25 10*3/MM3 (ref 0.7–3.1)
LYMPHOCYTES NFR BLD AUTO: 16.5 % (ref 19.6–45.3)
MCH RBC QN AUTO: 22.3 PG (ref 26.6–33)
MCHC RBC AUTO-ENTMCNC: 30.9 G/DL (ref 31.5–35.7)
MCV RBC AUTO: 72.2 FL (ref 79–97)
MONOCYTES # BLD AUTO: 0.34 10*3/MM3 (ref 0.1–0.9)
MONOCYTES NFR BLD AUTO: 4.5 % (ref 5–12)
NEUTROPHILS NFR BLD AUTO: 5.81 10*3/MM3 (ref 1.7–7)
NEUTROPHILS NFR BLD AUTO: 76.6 % (ref 42.7–76)
PLATELET # BLD AUTO: 382 10*3/MM3 (ref 140–450)
PMV BLD AUTO: 8.7 FL (ref 6–12)
RBC # BLD AUTO: 5.11 10*6/MM3 (ref 3.77–5.28)
TIBC SERPL-MCNC: 539 MCG/DL (ref 298–536)
TRANSFERRIN SERPL-MCNC: 362 MG/DL (ref 200–360)
WBC NRBC COR # BLD AUTO: 7.58 10*3/MM3 (ref 3.4–10.8)

## 2024-03-22 PROCEDURE — 99213 OFFICE O/P EST LOW 20 MIN: CPT | Performed by: NURSE PRACTITIONER

## 2024-03-22 PROCEDURE — 85025 COMPLETE CBC W/AUTO DIFF WBC: CPT

## 2024-03-22 PROCEDURE — 36415 COLL VENOUS BLD VENIPUNCTURE: CPT

## 2024-03-22 PROCEDURE — 82728 ASSAY OF FERRITIN: CPT

## 2024-03-22 PROCEDURE — 84466 ASSAY OF TRANSFERRIN: CPT

## 2024-03-22 PROCEDURE — 83540 ASSAY OF IRON: CPT

## 2024-03-22 NOTE — PROGRESS NOTES
CHIEF COMPLAINT: No new somatic complaints    Problem List:  Oncology/Hematology History Overview Note   1.  Portal vein thrombosis with massive splenomegaly and mild elevation of liver enzymes with recurrent miscarriages with negative hypercoagulable workup:     -1/4/2024 [facility] inpatient hematology initial consultation: I strongly suspect antiphospholipid antibody syndrome.  Treatment of choice long-term in that case would be Coumadin not a DOAC.  In the near term, I would suggest putting her on Lovenox 1 mg/kg subcu every 12 hours.  Provided that she has enough supply to last her for the next couple of weeks and that this is procure before her discharge, I would be Okay with her going home on that and her hypercoagulable workup has been sent as reviewed with her hospitalist earlier today.  I will plan on seeing her back in the next couple of weeks and hopefully by that point we will have the results of her hypercoagulable workup to make the decision of what drug to transition to from her Lovenox as to Coumadin versus Eliquis.    -1/5/2024 [facility] inpatient hematology follow-up:Protein C antigen and activity, protein S antigen total and free and functional, beta-2 glycoprotein, anticardiolipin, lupus anticoagulant and Antithrombin III all pending.Prothrombin gene mutation and factor V Leiden mutation negative.  ROLA negative.  Has a 2-week supply of Lovenox and we should have results on these tests by the 12th when I see her back in my office to discern as to transitioning to Coumadin versus switching to Eliquis.  Nothing on MRI abdomen MRCP or CT abdomen pelvis With and without contrast to suggest a pancreatic neoplasm.  The pancreatic head was mildly enlarged and lobular but no discrete mass.  Hemoglobin 11.7 with MCV 76.7 and I will repeat her CBC and get iron indices when I see her back but at 39 odds of malignancy related cause of anemia low but we can consider Cologuard when I see her back.  Alkaline  phosphatase 194 today with ALT 30 AST 21 now normal with normal bilirubin 0.8 and glucose 100.  Patient ready for discharge.     --1/12/2024 [facility] hematology outpatient follow-up: I had initially seen the patient as an inpatient on 1/4/2024 with a fairly strong suspicion for antiphospholipid antibody syndrome with the above history and had her discharged on Lovenox 1 mg/kg subcu every 12 hours until results of hypercoagulable panel were back to decide as to transitioning to Coumadin versus.  The following is the results of her hypercoagulable panel…  Protein C antigen 64%, protein C activity 81% normal.  Protein S functional 61% lower limit of normal 63%, protein S antigen free 77%, protein S antigen total 107%.  Antithrombin %.  Beta-2 glycoprotein/anticardiolipin antibody/lupus anticoagulant negative.  PTT normal.  INR 1.36  Prothrombin gene mutation/factor V Leiden mutation negative.  Homocystine 8.3.  EBV IgM negative  ROLA negative  Alkaline phosphatase 194 glucose 100 otherwise unremarkable CMP.  Hemoglobin 11.7 MCV 76.7.     Hence, she has no evidence of hypercoagulable condition.  I will check a [facility] panel for completeness sake given the oddity of an unprovoked portal vein thrombus and I have asked her to get with her primary care to get a mammogram and to see OB/GYN to make sure no malignancy of her pelvic organs though that is also low of the likelihood.  Lastly with her microcytic anemia likely due to her menstrual flow I will check her iron studies with CBC and place her on ferrous sulfate 325 mg every other day.  I will see her back in a few weeks to go over these labs and assuming no PNH and she is tolerating the oral iron then she will follow-up with primary care and OB/GYN for those loose ends and I recommended a baseline colonoscopy at age 44 and sooner if she is not correcting her hemoglobin with oral iron if her menses are abating but she could have increased menstrual flow due to lifetime  Eliquis which we are now switching her to given no antiphospholipid antibody syndrome.  Her protein S functional just barely below normal with otherwise normal antigen levels makes protein S deficiency as a cause very unlikely and clot itself can cause this mild degree of low functional protein S.  She knows how to switch out the Eliquis 1: 1 with her Lovenox.  She simply replaces the Eliquis dose at the time of her next Lovenox dose and stopped the Lovenox.  She will hang onto her Lovenox just in case she has trouble.  if she failed Eliquis, she would need lifetime Lovenox most likely given the lack of antiphospholipid antibody syndrome.    -1/16/2024 hemoglobin 11.2 MCV 75.8 platelets 536,000 otherwise unremarkable CBC and differential.  Ferritin normal 64.54.  Iron low 17 with saturation low 4% and transferrin 294 with total iron binding capacity 438  No evidence of PNH    -2/2/24 [facility] hematology follow-up: I reviewed the above data with the patient.  Complete hypercoagulable workup negative save for slightly low functional protein S that I do not think is significant.  She is iron deficient and almost assuredly due to menses but for completeness sake I will order a Cologuard on her stool and mammograms and she will see my nurse practitioner back to go over results.  If the Cologuard is positive then she will need to get colonoscopy with cessation of her Eliquis a couple days in advance with some attendant risk of further portal vein thrombosis.  If all of this is negative, other than for making sure that she gets a thorough gynecologic exam to rule out pelvic malignancy as a source of hypercoagulability, and then I would do no further workup as to the etiology of her thrombus that we have worked up thoroughly as above.  She can then just follow with her primary care for refills of Eliquis lifetime as outlined.  Her iron indices are consistent with partially treated iron deficiency likely just due to menses  "but we shall see.     Portal vein thrombosis       HISTORY OF PRESENT ILLNESS:  The patient is a 38 y.o. female, here for follow up on management of portal vein thrombosis with splenomegaly on anticoagulation.  She had Cologuard testing that was negative.  She does not consistently take her iron supplement due to GI issues.  She does try to eat iron rich foods.  She had diagnostic mammogram that showed complicated cyst versus solid mass.  She had ultrasound and will have biopsy if persist.        Past Medical History:   Diagnosis Date    History of recurrent miscarriages     X 3     History reviewed. No pertinent surgical history.    Allergies   Allergen Reactions    Penicillins Hives       Family History and Social History reviewed and changed as necessary    REVIEW OF SYSTEM:   No new somatic complaints    PHYSICAL EXAM:  Respirations even and unlabored  No jaundice or icterus or pallor.      Vitals:    03/22/24 1354   BP: 134/84   Pulse: 88   Resp: 18   Temp: 97.5 °F (36.4 °C)   TempSrc: Infrared   SpO2: 98%   Weight: 120 kg (265 lb)   Height: 170.2 cm (67\")     Vitals:    03/22/24 1354   PainSc: 0-No pain        ECOG score: 0           Vitals reviewed.    ECOG: (0) Fully Active - Able to Carry On All Pre-disease Performance Without Restriction    Lab Results   Component Value Date    HGB 11.4 (L) 03/22/2024    HCT 36.9 03/22/2024    MCV 72.2 (L) 03/22/2024     03/22/2024    WBC 7.58 03/22/2024    NEUTROABS 5.81 03/22/2024    LYMPHSABS 1.25 03/22/2024    MONOSABS 0.34 03/22/2024    EOSABS 0.12 03/22/2024    BASOSABS 0.04 03/22/2024       Lab Results   Component Value Date    GLUCOSE 100 (H) 01/05/2024    BUN 6 01/05/2024    CREATININE 0.76 01/05/2024     01/05/2024    K 3.8 01/05/2024     01/05/2024    CO2 24.0 01/05/2024    CALCIUM 8.7 01/05/2024    PROTEINTOT 6.6 01/05/2024    ALBUMIN 3.9 01/05/2024    BILITOT 0.8 01/05/2024    ALKPHOS 194 (H) 01/05/2024    AST 21 01/05/2024    ALT 30 " 01/05/2024             ASSESSMENT & PLAN:  1.  Portal vein thrombosis with massive splenomegaly and mild elevation of liver enzymes with recurrent miscarriages with negative hypercoagulable workup:     -1/4/2024 [facility] inpatient hematology initial consultation: I strongly suspect antiphospholipid antibody syndrome.  Treatment of choice long-term in that case would be Coumadin not a DOAC.  In the near term, I would suggest putting her on Lovenox 1 mg/kg subcu every 12 hours.  Provided that she has enough supply to last her for the next couple of weeks and that this is procure before her discharge, I would be Okay with her going home on that and her hypercoagulable workup has been sent as reviewed with her hospitalist earlier today.  I will plan on seeing her back in the next couple of weeks and hopefully by that point we will have the results of her hypercoagulable workup to make the decision of what drug to transition to from her Lovenox as to Coumadin versus Eliquis.    -1/5/2024 [facility] inpatient hematology follow-up:Protein C antigen and activity, protein S antigen total and free and functional, beta-2 glycoprotein, anticardiolipin, lupus anticoagulant and Antithrombin III all pending.Prothrombin gene mutation and factor V Leiden mutation negative.  ROLA negative.  Has a 2-week supply of Lovenox and we should have results on these tests by the 12th when I see her back in my office to discern as to transitioning to Coumadin versus switching to Eliquis.  Nothing on MRI abdomen MRCP or CT abdomen pelvis With and without contrast to suggest a pancreatic neoplasm.  The pancreatic head was mildly enlarged and lobular but no discrete mass.  Hemoglobin 11.7 with MCV 76.7 and I will repeat her CBC and get iron indices when I see her back but at 39 odds of malignancy related cause of anemia low but we can consider Cologuard when I see her back.  Alkaline phosphatase 194 today with ALT 30 AST 21 now normal with normal  bilirubin 0.8 and glucose 100.  Patient ready for discharge.     --1/12/2024 [facility] hematology outpatient follow-up: I had initially seen the patient as an inpatient on 1/4/2024 with a fairly strong suspicion for antiphospholipid antibody syndrome with the above history and had her discharged on Lovenox 1 mg/kg subcu every 12 hours until results of hypercoagulable panel were back to decide as to transitioning to Coumadin versus.  The following is the results of her hypercoagulable panel…  Protein C antigen 64%, protein C activity 81% normal.  Protein S functional 61% lower limit of normal 63%, protein S antigen free 77%, protein S antigen total 107%.  Antithrombin %.  Beta-2 glycoprotein/anticardiolipin antibody/lupus anticoagulant negative.  PTT normal.  INR 1.36  Prothrombin gene mutation/factor V Leiden mutation negative.  Homocystine 8.3.  EBV IgM negative  ROLA negative  Alkaline phosphatase 194 glucose 100 otherwise unremarkable CMP.  Hemoglobin 11.7 MCV 76.7.     Hence, she has no evidence of hypercoagulable condition.  I will check a [facility] panel for completeness sake given the oddity of an unprovoked portal vein thrombus and I have asked her to get with her primary care to get a mammogram and to see OB/GYN to make sure no malignancy of her pelvic organs though that is also low of the likelihood.  Lastly with her microcytic anemia likely due to her menstrual flow I will check her iron studies with CBC and place her on ferrous sulfate 325 mg every other day.  I will see her back in a few weeks to go over these labs and assuming no PNH and she is tolerating the oral iron then she will follow-up with primary care and OB/GYN for those loose ends and I recommended a baseline colonoscopy at age 44 and sooner if she is not correcting her hemoglobin with oral iron if her menses are abating but she could have increased menstrual flow due to lifetime Eliquis which we are now switching her to given no  antiphospholipid antibody syndrome.  Her protein S functional just barely below normal with otherwise normal antigen levels makes protein S deficiency as a cause very unlikely and clot itself can cause this mild degree of low functional protein S.  She knows how to switch out the Eliquis 1: 1 with her Lovenox.  She simply replaces the Eliquis dose at the time of her next Lovenox dose and stopped the Lovenox.  She will hang onto her Lovenox just in case she has trouble.  if she failed Eliquis, she would need lifetime Lovenox most likely given the lack of antiphospholipid antibody syndrome.    -1/16/2024 hemoglobin 11.2 MCV 75.8 platelets 536,000 otherwise unremarkable CBC and differential.  Ferritin normal 64.54.  Iron low 17 with saturation low 4% and transferrin 294 with total iron binding capacity 438  No evidence of PNH    -2/2/24 [facility] hematology follow-up: I reviewed the above data with the patient.  Complete hypercoagulable workup negative save for slightly low functional protein S that I do not think is significant.  She is iron deficient and almost assuredly due to menses but for completeness sake I will order a Cologuard on her stool and mammograms and she will see my nurse practitioner back to go over results.  If the Cologuard is positive then she will need to get colonoscopy with cessation of her Eliquis a couple days in advance with some attendant risk of further portal vein thrombosis.  If all of this is negative, other than for making sure that she gets a thorough gynecologic exam to rule out pelvic malignancy as a source of hypercoagulability, and then I would do no further workup as to the etiology of her thrombus that we have worked up thoroughly as above.  She can then just follow with her primary care for refills of Eliquis lifetime as outlined.  Her iron indices are consistent with partially treated iron deficiency likely just due to menses but we shall see.    -3/22/2024 [facility] hematology  follow-up:  She continues on Eliquis without any significant issues.  She had Cologuard testing that was negative.  She needs thorough gynecologic exam to rule out pelvic malignancy as a source of hyper coagulability.  She is going to schedule appointment with them.  We will check CBC, iron profile, and ferritin today.  I will follow up on results and notify patient.  Otherwise, she can follow up with her PCP for refills on Eliquis lifetime as outlined above.  She had diagnostic mammogram and ultrasound that showed complicated cyst versus solid mass.  She is waiting to have biopsy scheduled if it persist.      Total time of patient care on day of service including time prior to, face to face with patient, and following visit spent in reviewing records, lab results, imaging studies, discussion with patient, and documentation/charting was > 25 minutes.     Renetta Catherine, APRN  03/22/2024

## 2024-04-12 ENCOUNTER — HOSPITAL ENCOUNTER (OUTPATIENT)
Dept: ULTRASOUND IMAGING | Facility: HOSPITAL | Age: 39
Discharge: HOME | End: 2024-04-12
Payer: COMMERCIAL

## 2024-04-12 ENCOUNTER — HOSPITAL ENCOUNTER (OUTPATIENT)
Dept: MAMMOGRAPHY | Facility: HOSPITAL | Age: 39
Discharge: HOME | End: 2024-04-12
Payer: COMMERCIAL

## 2024-04-12 DIAGNOSIS — R92.8 ABNORMAL MAMMOGRAM: ICD-10-CM

## 2024-04-12 PROCEDURE — A4648 IMPLANTABLE TISSUE MARKER: HCPCS

## 2024-04-12 PROCEDURE — 25010000002 LIDOCAINE 1 % SOLUTION: Performed by: RADIOLOGY

## 2024-04-12 RX ORDER — LIDOCAINE HYDROCHLORIDE AND EPINEPHRINE 10; 10 MG/ML; UG/ML
5 INJECTION, SOLUTION INFILTRATION; PERINEURAL ONCE
Status: COMPLETED | OUTPATIENT
Start: 2024-04-12 | End: 2024-04-12

## 2024-04-12 RX ORDER — LIDOCAINE HYDROCHLORIDE 10 MG/ML
5 INJECTION, SOLUTION INFILTRATION; PERINEURAL ONCE
Status: COMPLETED | OUTPATIENT
Start: 2024-04-12 | End: 2024-04-12

## 2024-04-12 RX ADMIN — LIDOCAINE HYDROCHLORIDE,EPINEPHRINE BITARTRATE 1.5 ML: 10; .01 INJECTION, SOLUTION INFILTRATION; PERINEURAL at 15:45

## 2024-04-12 RX ADMIN — Medication 1 ML: at 15:45

## 2024-04-12 NOTE — PROGRESS NOTES
Alert and orientated. Denies discomfort, no active bleeding, steri-strips intact, gauze dressing applied. Cold pack applied to breast over bandage. Cold pack given. Questions answered, support given. Verbalizes and demonstrates understanding of post-care instructions, written copy given.

## 2024-04-15 ENCOUNTER — TELEPHONE (OUTPATIENT)
Dept: MAMMOGRAPHY | Facility: HOSPITAL | Age: 39
End: 2024-04-15
Payer: COMMERCIAL

## 2024-04-15 LAB
CYTO UR: NORMAL
LAB AP CASE REPORT: NORMAL
LAB AP DIAGNOSIS COMMENT: NORMAL
PATH REPORT.FINAL DX SPEC: NORMAL
PATH REPORT.GROSS SPEC: NORMAL

## 2024-04-15 NOTE — TELEPHONE ENCOUNTER
Patient notified of pathology results and recommendation. Verbalizes understanding. Denies discomfort. Denies signs and symptoms of infection. Questions answered, support given, verbalized understanding. Verified patient has [facility] scheduling number to call and schedule recommended follow-up.

## 2024-09-24 ENCOUNTER — OFFICE VISIT (OUTPATIENT)
Dept: OBSTETRICS AND GYNECOLOGY | Facility: CLINIC | Age: 39
End: 2024-09-24
Payer: COMMERCIAL

## 2024-09-24 VITALS
HEIGHT: 67 IN | WEIGHT: 256 LBS | SYSTOLIC BLOOD PRESSURE: 118 MMHG | BODY MASS INDEX: 40.18 KG/M2 | DIASTOLIC BLOOD PRESSURE: 84 MMHG

## 2024-09-24 DIAGNOSIS — N92.0 MENORRHAGIA WITH REGULAR CYCLE: ICD-10-CM

## 2024-09-24 DIAGNOSIS — Z01.419 WOMEN'S ANNUAL ROUTINE GYNECOLOGICAL EXAMINATION: ICD-10-CM

## 2024-09-24 DIAGNOSIS — Z12.31 BREAST CANCER SCREENING BY MAMMOGRAM: ICD-10-CM

## 2024-09-24 DIAGNOSIS — I81 PORTAL VEIN THROMBOSIS: Primary | Chronic | ICD-10-CM

## 2024-09-24 DIAGNOSIS — N94.6 DYSMENORRHEA: ICD-10-CM

## 2024-09-24 DIAGNOSIS — Z79.01 LONG TERM (CURRENT) USE OF ANTICOAGULANTS: ICD-10-CM

## 2024-09-24 PROCEDURE — 99396 PREV VISIT EST AGE 40-64: CPT | Performed by: NURSE PRACTITIONER

## 2025-02-09 LAB
NCCN CRITERIA FLAG: NORMAL
TYRER CUZICK SCORE: 16

## 2025-06-04 ENCOUNTER — TELEPHONE (OUTPATIENT)
Dept: ONCOLOGY | Facility: CLINIC | Age: 40
End: 2025-06-04
Payer: COMMERCIAL

## 2025-06-04 NOTE — TELEPHONE ENCOUNTER
Caller: Esperanza Waldron    Relationship: Self    Best call back number:   Telephone Information:   Mobile [phone]     What was the call regarding: PATIENT WANTED TO START AN EXERCISE PROGRAM WITH LIGHT WEIGHT LIFTING, DUE TO HER PREVIOUS HISTORY OF GETTING BLOOD CLOTS IS THAT GOING TO BE OKAY, AND HER HEART RATE WOULD BE AT THE MODERATE LEVEL?    CAN RESPOND THROUGH [email or web address]